# Patient Record
Sex: FEMALE | Race: WHITE | NOT HISPANIC OR LATINO | Employment: FULL TIME | URBAN - METROPOLITAN AREA
[De-identification: names, ages, dates, MRNs, and addresses within clinical notes are randomized per-mention and may not be internally consistent; named-entity substitution may affect disease eponyms.]

---

## 2023-05-10 ENCOUNTER — OFFICE VISIT (OUTPATIENT)
Dept: FAMILY MEDICINE | Facility: CLINIC | Age: 53
End: 2023-05-10
Payer: OTHER GOVERNMENT

## 2023-05-10 VITALS
SYSTOLIC BLOOD PRESSURE: 125 MMHG | OXYGEN SATURATION: 99 % | RESPIRATION RATE: 18 BRPM | DIASTOLIC BLOOD PRESSURE: 87 MMHG | HEIGHT: 65 IN | TEMPERATURE: 96.9 F | WEIGHT: 152 LBS | BODY MASS INDEX: 25.33 KG/M2 | HEART RATE: 76 BPM

## 2023-05-10 DIAGNOSIS — R51.9 BILATERAL HEADACHES: Primary | ICD-10-CM

## 2023-05-10 DIAGNOSIS — M79.675 PAIN OF TOE OF LEFT FOOT: ICD-10-CM

## 2023-05-10 PROCEDURE — 99203 OFFICE O/P NEW LOW 30 MIN: CPT | Performed by: PHYSICIAN ASSISTANT

## 2023-05-10 RX ORDER — TOPIRAMATE SPINKLE 25 MG/1
25 CAPSULE ORAL DAILY
COMMUNITY
Start: 2022-11-21 | End: 2023-05-10

## 2023-05-10 RX ORDER — TOPIRAMATE SPINKLE 25 MG/1
25 CAPSULE ORAL 2 TIMES DAILY
Qty: 60 CAPSULE | Refills: 3 | Status: SHIPPED | OUTPATIENT
Start: 2023-05-10 | End: 2023-06-21

## 2023-05-10 RX ORDER — NAPROXEN 500 MG/1
500 TABLET ORAL 2 TIMES DAILY WITH MEALS
Qty: 20 TABLET | Refills: 0 | Status: SHIPPED | OUTPATIENT
Start: 2023-05-10 | End: 2023-05-21

## 2023-05-10 ASSESSMENT — ENCOUNTER SYMPTOMS: HEADACHES: 1

## 2023-05-10 ASSESSMENT — PAIN SCALES - GENERAL: PAINLEVEL: EXTREME PAIN (8)

## 2023-05-10 NOTE — PROGRESS NOTES
Leticia Gómez is a 52 year old, presenting for the following health issues:  Headache, Foot Pain (left), and Health Maintenance ([facility] in [place])      Headache     History of Present Illness       Reason for visit:  Pain in my foot and headache  Symptom onset:  More than a month  Symptoms include:  Pain  Symptom intensity:  Severe  Symptom progression:  Staying the same  Had these symptoms before:  Yes  Has tried/received treatment for these symptoms:  Yes  Previous treatment was successful:  No  What makes it worse:  Read  What makes it better:  Cold    She eats 0-1 servings of fruits and vegetables daily.She consumes 2 sweetened beverage(s) daily.She exercises with enough effort to increase her heart rate 10 to 19 minutes per day.  She exercises with enough effort to increase her heart rate 3 or less days per week. She is missing 1 dose(s) of medications per week.  She is not taking prescribed medications regularly due to remembering to take.     Last year she was seen in [place] for her L foot pain  She had a cortisone shot and wore a boot and that helped  Having new pain in the 2nd toe x 3 weeks  She has tried ibuprofen without relief  Leg elevation has helped  Denies any history of gout    Pt has been having headaches chronically (several years)  I don't have any records from her visit regarding this from [place]  Presume migraine type HA  Pt denies any hx of aura  HA recently seem worse and are daily and constant  She has been trying Topamax without relief  Sometimes the Exedrin helps and sometimes not  HA is pain in the sinuses and neck  She has hx of vertigo but not recently  She did see neurology when living in [place] many years ago for her HA  She has vision problems and does wear contact or glasses   She is taking prempro and thinks this could be causing weight gain  She started the prempro last year for hot flashes  She feels worse in general since going into  "menopause    Past Medical History:   Diagnosis Date     Hearing loss      Past Surgical History:   Procedure Laterality Date     COSMETIC SURGERY  2002    Blast     Social History     Tobacco Use     Smoking status: Never     Smokeless tobacco: Never   Vaping Use     Vaping status: Never Used   Substance Use Topics     Alcohol use: Not Currently     Comment: occ     Current Outpatient Medications   Medication Sig Dispense Refill     estrogen conj-medroxyPROGESTERone (PREMPRO) 0.3-1.5 MG tablet Take 1 tablet by mouth daily       naproxen (NAPROSYN) 500 MG tablet Take 1 tablet (500 mg) by mouth 2 times daily (with meals) 20 tablet 0     topiramate (TOPAMAX) 25 MG capsule Take 1 capsule (25 mg) by mouth 2 times daily 60 capsule 3     Allergies   Allergen Reactions     No Known Allergies          PHYSICAL EXAM:    /87 (BP Location: Right arm, Patient Position: Sitting, Cuff Size: Adult Regular)   Pulse 76   Temp 96.9  F (36.1  C) (Temporal)   Resp 18   Ht 1.651 m (5' 5\")   Wt 68.9 kg (152 lb)   LMP 12/01/2020   SpO2 99%   BMI 25.29 kg/m      Patient appears non toxic  Neuro: normal speech and gait  Normal facial symmetry  L 2nd toe tender over PIP with some bony prominence    Assessment and Plan:     (R51.9) Bilateral headaches  (primary encounter diagnosis)  Comment: suspect migraines as she's had these for many years. May be worse after the prempro was started for hot flashes.  Recd she decreased dose of prempro to every other day for now and increase dose of topamax from 25mg every day to to bid.  Consider tapering completely off of prempro as HRT may be making HA worse. F/u with PCP which is schedule and referral to neuro, She had imaging for HA years ago but those records not avail in [facility].  Plan: Adult Neurology  Referral, topiramate         (TOPAMAX) 25 MG capsule            (M79.675) Pain of toe of left foot  Comment: suspect her pain due to hammertoe. Recd she see podiatry for this. " Advised wearing shoes that don't put pressure on the joint.  Plan: naproxen (NAPROSYN) 500 MG tablet        Bid with food x10d. Warm water soaks.        Smiley García PA-C

## 2023-05-12 ENCOUNTER — ANCILLARY PROCEDURE (OUTPATIENT)
Dept: GENERAL RADIOLOGY | Facility: CLINIC | Age: 53
End: 2023-05-12
Attending: PODIATRIST
Payer: OTHER GOVERNMENT

## 2023-05-12 ENCOUNTER — OFFICE VISIT (OUTPATIENT)
Dept: PODIATRY | Facility: CLINIC | Age: 53
End: 2023-05-12
Payer: OTHER GOVERNMENT

## 2023-05-12 VITALS
OXYGEN SATURATION: 99 % | HEIGHT: 65 IN | SYSTOLIC BLOOD PRESSURE: 115 MMHG | HEART RATE: 64 BPM | DIASTOLIC BLOOD PRESSURE: 81 MMHG | WEIGHT: 148 LBS | BODY MASS INDEX: 24.66 KG/M2

## 2023-05-12 DIAGNOSIS — M79.675 TOE PAIN, LEFT: Primary | ICD-10-CM

## 2023-05-12 DIAGNOSIS — M20.42 HAMMER TOE OF LEFT FOOT: ICD-10-CM

## 2023-05-12 PROCEDURE — 99203 OFFICE O/P NEW LOW 30 MIN: CPT | Performed by: PODIATRIST

## 2023-05-12 PROCEDURE — 73630 X-RAY EXAM OF FOOT: CPT | Mod: TC | Performed by: RADIOLOGY

## 2023-05-12 NOTE — PROGRESS NOTES
ASSESSMENT:  Encounter Diagnoses   Name Primary?     Toe pain, left Yes     Hammer toe of left foot      MEDICAL DECISION MAKING:  I personally reviewed the x-ray images.  No fractures.  No worrisome bony lesions in the second toe.  The toe is subluxing medially off of the second metatarsal head.  There is contracture.    Pain likely from the deformity or faulty biomechanics involving the left second toe.    Recommendations:  Stiffer soled shoes to offload the toes during the propulsive phase of gait.    Shoes that accommodate the hammertoe deformity.  As needed NSAIDs and ice  Activity modification as needed.    I explained that this is more complicated than just a deformity of the second toe.  This relates to her hallux abductovalgus.  Any surgical intervention might involve surgery for the hallux abductovalgus as well.  This is not currently a source of pain.    Pain does not respond to conservative measures, will consider advanced imaging.    Disclaimer: This note consists of symbols derived from keyboarding, dictation and/or voice recognition software. As a result, there may be errors in the script that have gone undetected. Please consider this when interpreting information found in this chart.    Melvin Cordero DPM, FACFAS, MS    [facility] Department of Podiatry/Foot & Ankle Surgery      ____________________________________________________________________    HPI:         Chief Complaint: Left foot pain  Onset of problem: Weeks  Pain/ discomfort is described as: Aching  Pain Ratin out of 10  Frequency: Constant  The pain is exacerbated by walking wearing shoes  Previous treatment: Naproxen    *  Past Medical History:   Diagnosis Date     Hearing loss    *  *  Past Surgical History:   Procedure Laterality Date     COSMETIC SURGERY      Blast   *  *  Current Outpatient Medications   Medication Sig Dispense Refill     estrogen conj-medroxyPROGESTERone (PREMPRO) 0.3-1.5 MG tablet Take 1 tablet by mouth daily  "      naproxen (NAPROSYN) 500 MG tablet Take 1 tablet (500 mg) by mouth 2 times daily (with meals) 20 tablet 0     topiramate (TOPAMAX) 25 MG capsule Take 1 capsule (25 mg) by mouth 2 times daily 60 capsule 3         EXAM:    Vitals: /81   Pulse 64   Ht 1.651 m (5' 5\")   Wt 67.1 kg (148 lb)   LMP 12/01/2020   SpO2 99%   BMI 24.63 kg/m    BMI: Body mass index is 24.63 kg/m .    Constitutional:  Dalia Fried is in no apparent distress, appears well-nourished.  Cooperative with history and physical exam.    Vascular:  Pedal pulses are palpable for both the DP and PT arteries.  CFT < 3 sec.  No edema.      Neuro: Light touch sensation is intact to the L4, L5, S1 distributions  No evidence of weakness, spasticity, or contracture in the lower extremities.     Derm: Normal texture and turgor.  Localized erythema dorsally over the left second toe proximal interphalangeal joint.      Musculoskeletal:    Lower extremity muscle strength is normal.  Hallux abductovalgus on the left.  The second toe is drifting dorsally and medially.  There is some contracture at the proximal interphalangeal joint.      X-Ray Findings:  I personally reviewed the left foot images.  Please see comments above          "

## 2023-05-12 NOTE — PATIENT INSTRUCTIONS
Thank you for choosing [facility] / [facility] Ankle Surgery!    DR. KUMAR'S CLINIC LOCATIONS:     [facility] TRIAGE LINE: [phone]   [address] APPOINTMENTS: [phone]   [place] [address] RADIOLOGY: [phone]   (Every other Tues - Wed - Fri PM) SET UP SURGERY: [phone]    PHYSICAL THERAPY: [phone]   [facility] BILLING QUESTIONS: [phone] [address] FAX: [phone]   [place] [address]    (Thurs & Fri AM)          HAMMERTOES  Hammertoe is a contracture (bending) of one or both joints of the second, third, fourth, or fifth (little) toes. This abnormal bending can put pressure on the toe when wearing shoes, causing problems to develop.  Hammertoes usually start out as mild deformities and get progressively worse over time. In the earlier stages, hammertoes are flexible and the symptoms can often be managed with noninvasive measures. But if left untreated, hammertoes can become more rigid and will not respond to non-surgical treatment.  Because of the progressive nature of hammertoes, they should receive early attention. Hammertoes never get better without some kind of intervention.  CAUSES  The most common cause of hammertoe is a muscle/tendon imbalance. This imbalance, which leads to a bending of the toe, results from mechanical (structural) changes in the foot that occur over time in some people.  Hammertoes may be aggravated by shoes that don t fit properly. A hammertoe may result if a toe is too long and is forced into a cramped position when a tight shoe is worn.  Occasionally, hammertoe is the result of an earlier trauma to the toe. In some people, hammertoes are inherited.  SYMPTOMS  Pain or irritation of the affected toe when wearing shoes.   Corns and calluses (a buildup of skin) on the toe, between two toes, or on the ball of the foot. Corns are caused by constant friction against the shoe. They may be soft or hard, depending  upon their location.   Inflammation, redness, or a burning sensation   Contracture of the toe   In more severe cases of hammertoe, open sores may form.   DIAGNOSIS  Although hammertoes are readily apparent, to arrive at a diagnosis the foot and ankle surgeon will obtain a thorough history of your symptoms and examine your foot. During the physical examination, the doctor may attempt to reproduce your symptoms by manipulating your foot and will study the contractures of the toes. In addition, the foot and ankle surgeon may take x-rays to determine the degree of the deformities and assess any changes that may have occurred.   Hammertoes are progressive - they don t go away by themselves and usually they will get worse over time. However, not all cases are alike - some hammertoes progress more rapidly than others. Once your foot and ankle surgeon has evaluated your hammertoes, a treatment plan can be developed that is suited to your needs.  NON-SURGICAL TREATMENT  There is a variety of treatment options for hammertoe. The treatment your foot and ankle surgeon selects will depend upon the severity of your hammertoe and other factors.  A number of non-surgical measures can be undertaken:  Padding corns and calluses. Your foot and ankle surgeon can provide or prescribe pads designed to shield corns from irritation. If you want to try over-the-counter pads, avoid the medicated types. Medicated pads are generally not recommended because they may contain a small amount of acid that can be harmful. Consult your surgeon about this option.   Changes in shoewear. Avoid shoes with pointed toes, shoes that are too short, or shoes with high heels - conditions that can force your toe against the front of the shoe. Instead, choose comfortable shoes with a deep, roomy toe box and heels no higher than two inches.   Orthotic devices. A custom orthotic device placed in your shoe may help control the muscle/tendon imbalance.   Injection  therapy. Corticosteroid injections are sometimes used to ease pain and inflammation caused by hammertoe.   Medications. Oral nonsteroidal anti-inflammatory drugs (NSAIDs), such as ibuprofen, may be recommended to reduce pain and inflammation.   Splinting/strapping. Splints or small straps may be applied by the surgeon to realign the bent toe.   Exercises:   1. The Toe Tap  Stand flat on the ground in your bare feet. Raise all of your toes up off the ground as high as you can. Then starting with the little toes, slowly press them down to the ground. After the big toes are on the ground, start over by raising all of them up off the ground again. This motion is similar to tapping your fingers on a desk. Repeat this ten times.     2. Interlocking your Fingers and Toes  Cross your right foot over your knee and place the fingers of your left hand between your toes. Squeeze your toes together, pinching your fingers between them. Spread the toes apart and squeeze them together again. Repeat this ten times then do the other foot. Like most exercises, this will get easier the more you do it. If you are having a lot of difficulty with this exercise, start with just your index finger between your first and second toe, then later add your middle finger between your second and third toes, and so on until you can fit all your fingers between your toes. Do this ten times on each foot. Eventually you will be able to spread your toes apart without using your fingers.    3. Gripping the Floor   the floor by pressing the pads of your toes (not the tips) into the floor without curling your toes. Relax and repeat this ten times. If your shoes have the proper amount of depth, you should be able to do this with shoes on.    HAMMERTOE TOE SURGERY   Hammertoe surgery is complex. The surgical procedure is an attempt to help the toe lay in a better position. Nearly every structure in the toe will be cut including the tendons, ligaments,  skin and bone. Hammertoes are a complex deformity and final toe position is difficult to predict. The only sure way to position a toe is to fuse all three digital joints. That will not happen as some degree of toe motion is needed for walking. The toe may not be completely reduced as the surrounding skin and other structures may not allow the toe to return to a normal position. The tendons on adjacent toes may need to be cut at the time of the original or subsequent surgeries, as interconnections exist between the toes. The toe may drift after surgery. Stiffness may develop leading to new areas of pressure.   Future shoe choices will be critical in allowing the surgery to provide comfort. The toes will still hurt if shoes rub. The original pain may also persist as other foot problems may be contributing to the current pain. The toe may or may not be pinned in place. External pins would require complete avoidance of water on the foot for six weeks. The pin would be removed about six weeks after the surgery. Strict attention to protection is critical. The pin could get bumped or loosen resulting in early removal. Removal might be necessary before the bone heals which would negatively affect the final surgical outcome and toe allignment.

## 2023-05-12 NOTE — LETTER
2023         RE: Dalia Fried  [id number]  [facility] [address]        Dear Colleague,    Thank you for referring your patient, Dalia Fried, to the [facility]. Please see a copy of my visit note below.    ASSESSMENT:  Encounter Diagnoses   Name Primary?     Toe pain, left Yes     Hammer toe of left foot      MEDICAL DECISION MAKING:  I personally reviewed the x-ray images.  No fractures.  No worrisome bony lesions in the second toe.  The toe is subluxing medially off of the second metatarsal head.  There is contracture.    Pain likely from the deformity or faulty biomechanics involving the left second toe.    Recommendations:  Stiffer soled shoes to offload the toes during the propulsive phase of gait.    Shoes that accommodate the hammertoe deformity.  As needed NSAIDs and ice  Activity modification as needed.    I explained that this is more complicated than just a deformity of the second toe.  This relates to her hallux abductovalgus.  Any surgical intervention might involve surgery for the hallux abductovalgus as well.  This is not currently a source of pain.    Pain does not respond to conservative measures, will consider advanced imaging.    Disclaimer: This note consists of symbols derived from keyboarding, dictation and/or voice recognition software. As a result, there may be errors in the script that have gone undetected. Please consider this when interpreting information found in this chart.    Melvin Cordero DPM, FACFAS, [facility] Department of Podiatry/Foot & Ankle Surgery      ____________________________________________________________________    HPI:         Chief Complaint: Left foot pain  Onset of problem: Weeks  Pain/ discomfort is described as: Aching  Pain Ratin out of 10  Frequency: Constant  The pain is exacerbated by walking wearing shoes  Previous treatment: Naproxen    *  Past Medical History:   Diagnosis Date     Hearing loss   "  *  *  Past Surgical History:   Procedure Laterality Date     COSMETIC SURGERY  2002    Blast   *  *  Current Outpatient Medications   Medication Sig Dispense Refill     estrogen conj-medroxyPROGESTERone (PREMPRO) 0.3-1.5 MG tablet Take 1 tablet by mouth daily       naproxen (NAPROSYN) 500 MG tablet Take 1 tablet (500 mg) by mouth 2 times daily (with meals) 20 tablet 0     topiramate (TOPAMAX) 25 MG capsule Take 1 capsule (25 mg) by mouth 2 times daily 60 capsule 3         EXAM:    Vitals: /81   Pulse 64   Ht 1.651 m (5' 5\")   Wt 67.1 kg (148 lb)   LMP 12/01/2020   SpO2 99%   BMI 24.63 kg/m    BMI: Body mass index is 24.63 kg/m .    Constitutional:  Dalia Fried is in no apparent distress, appears well-nourished.  Cooperative with history and physical exam.    Vascular:  Pedal pulses are palpable for both the DP and PT arteries.  CFT < 3 sec.  No edema.      Neuro: Light touch sensation is intact to the L4, L5, S1 distributions  No evidence of weakness, spasticity, or contracture in the lower extremities.     Derm: Normal texture and turgor.  Localized erythema dorsally over the left second toe proximal interphalangeal joint.      Musculoskeletal:    Lower extremity muscle strength is normal.  Hallux abductovalgus on the left.  The second toe is drifting dorsally and medially.  There is some contracture at the proximal interphalangeal joint.      X-Ray Findings:  I personally reviewed the left foot images.  Please see comments above              Again, thank you for allowing me to participate in the care of your patient.        Sincerely,        Melvin Cordero DPM    "

## 2023-05-21 ENCOUNTER — HEALTH MAINTENANCE LETTER (OUTPATIENT)
Age: 53
End: 2023-05-21

## 2023-05-21 ENCOUNTER — MYC REFILL (OUTPATIENT)
Dept: FAMILY MEDICINE | Facility: CLINIC | Age: 53
End: 2023-05-21
Payer: OTHER GOVERNMENT

## 2023-05-21 DIAGNOSIS — M79.675 PAIN OF TOE OF LEFT FOOT: ICD-10-CM

## 2023-05-22 ENCOUNTER — MYC REFILL (OUTPATIENT)
Dept: FAMILY MEDICINE | Facility: CLINIC | Age: 53
End: 2023-05-22
Payer: OTHER GOVERNMENT

## 2023-05-22 DIAGNOSIS — M79.675 PAIN OF TOE OF LEFT FOOT: ICD-10-CM

## 2023-05-23 RX ORDER — NAPROXEN 500 MG/1
500 TABLET ORAL 2 TIMES DAILY WITH MEALS
Qty: 20 TABLET | Refills: 0 | OUTPATIENT
Start: 2023-05-23

## 2023-05-23 RX ORDER — NAPROXEN 500 MG/1
500 TABLET ORAL 2 TIMES DAILY WITH MEALS
Qty: 20 TABLET | Refills: 0 | Status: SHIPPED | OUTPATIENT
Start: 2023-05-23 | End: 2023-05-31

## 2023-05-31 ENCOUNTER — MYC REFILL (OUTPATIENT)
Dept: FAMILY MEDICINE | Facility: CLINIC | Age: 53
End: 2023-05-31
Payer: OTHER GOVERNMENT

## 2023-05-31 DIAGNOSIS — M79.675 PAIN OF TOE OF LEFT FOOT: ICD-10-CM

## 2023-06-01 RX ORDER — NAPROXEN 500 MG/1
500 TABLET ORAL 2 TIMES DAILY WITH MEALS
Qty: 20 TABLET | Refills: 0 | Status: SHIPPED | OUTPATIENT
Start: 2023-06-01 | End: 2023-06-16

## 2023-06-16 ENCOUNTER — MYC REFILL (OUTPATIENT)
Dept: FAMILY MEDICINE | Facility: CLINIC | Age: 53
End: 2023-06-16
Payer: OTHER GOVERNMENT

## 2023-06-16 DIAGNOSIS — M79.675 PAIN OF TOE OF LEFT FOOT: ICD-10-CM

## 2023-06-16 RX ORDER — NAPROXEN 500 MG/1
500 TABLET ORAL 2 TIMES DAILY WITH MEALS
Qty: 20 TABLET | Refills: 0 | Status: SHIPPED | OUTPATIENT
Start: 2023-06-16 | End: 2023-06-21

## 2023-06-16 NOTE — TELEPHONE ENCOUNTER
Appointments in Next Year    Jun 21, 2023  2:30 PM  (Arrive by 2:10 PM)  Provider Visit with Isabel Velazquez NP  [facility] ([facility] ) [phone]   Aug 23, 2023 10:30 AM  (Arrive by 10:15 AM)  New Headache with Elisa Oneill PA-C  [facility] ([facility] ) [phone]        Pt has not yet been seen to establish care with provider     Krishna Mckeon RN  [facility]

## 2023-06-21 ENCOUNTER — OFFICE VISIT (OUTPATIENT)
Dept: FAMILY MEDICINE | Facility: CLINIC | Age: 53
End: 2023-06-21
Payer: OTHER GOVERNMENT

## 2023-06-21 VITALS
TEMPERATURE: 98.1 F | OXYGEN SATURATION: 99 % | HEIGHT: 64 IN | HEART RATE: 82 BPM | BODY MASS INDEX: 24.89 KG/M2 | WEIGHT: 145.8 LBS | RESPIRATION RATE: 18 BRPM | SYSTOLIC BLOOD PRESSURE: 109 MMHG | DIASTOLIC BLOOD PRESSURE: 81 MMHG

## 2023-06-21 DIAGNOSIS — M20.42 HAMMER TOE OF LEFT FOOT: Primary | ICD-10-CM

## 2023-06-21 DIAGNOSIS — Z12.31 VISIT FOR SCREENING MAMMOGRAM: ICD-10-CM

## 2023-06-21 DIAGNOSIS — Z11.59 NEED FOR HEPATITIS C SCREENING TEST: ICD-10-CM

## 2023-06-21 DIAGNOSIS — Z12.4 CERVICAL CANCER SCREENING: ICD-10-CM

## 2023-06-21 DIAGNOSIS — M79.675 PAIN OF TOE OF LEFT FOOT: ICD-10-CM

## 2023-06-21 DIAGNOSIS — Z12.11 SCREEN FOR COLON CANCER: ICD-10-CM

## 2023-06-21 DIAGNOSIS — Z11.4 SCREENING FOR HIV (HUMAN IMMUNODEFICIENCY VIRUS): ICD-10-CM

## 2023-06-21 PROBLEM — H81.10 BENIGN PAROXYSMAL POSITIONAL VERTIGO: Status: ACTIVE | Noted: 2023-06-21

## 2023-06-21 PROBLEM — C76.0: Status: ACTIVE | Noted: 2023-06-21

## 2023-06-21 PROBLEM — A88.1 EPIDEMIC VERTIGO: Status: ACTIVE | Noted: 2023-06-21

## 2023-06-21 PROCEDURE — 99214 OFFICE O/P EST MOD 30 MIN: CPT | Performed by: NURSE PRACTITIONER

## 2023-06-21 RX ORDER — NAPROXEN 500 MG/1
500 TABLET ORAL 2 TIMES DAILY WITH MEALS
Qty: 60 TABLET | Refills: 3 | Status: SHIPPED | OUTPATIENT
Start: 2023-06-21 | End: 2023-10-23

## 2023-06-21 ASSESSMENT — PAIN SCALES - GENERAL: PAINLEVEL: SEVERE PAIN (7)

## 2023-06-21 NOTE — PROGRESS NOTES
"  Assessment & Plan     Visit for screening mammogram  Will return for physical and did have mammogram will get abstracted from [facility]    Screen for colon cancer  - will consider at physical     Screening for HIV (human immunodeficiency virus)  - will consider at physical    Need for hepatitis C screening test  Will consider at physical    Cervical cancer screening  Will return for physical     Hammer toe of left foot  Will refer to [facility] for repair  - Orthopedic  Referral    Pain of toe of left foot  - medication refilled for pain  - naproxen (NAPROSYN) 500 MG tablet  Dispense: 60 tablet; Refill: 3    Migraine  - neurology appointment up coming.   - stop topamax as it is not working       BMI:   Estimated body mass index is 25.42 kg/m  as calculated from the following:    Height as of this encounter: 1.613 m (5' 3.5\").    Weight as of this encounter: 66.1 kg (145 lb 12.8 oz).   Weight management plan: Discussed healthy diet and exercise guidelines        Isabel Velazquez NP  [facility]    Leticia Gómez is a 52 year old, presenting for the following health issues:  Recheck Medication and Establish Care        6/21/2023     2:06 PM   Additional Questions   Roomed by Michell REYES     History of Present Illness       Headaches:   Since the patient's last clinic visit, headaches are: no change  The patient is getting headaches:  Daily  She is able to do normal daily activities when she has a migraine.  The patient is taking the following rescue/relief medications:  Other   Patient states \"I get only a small amount of relief\" from the rescue/relief medications.   The patient is taking the following medications to prevent migraines:  Topomax  In the past 4 weeks, the patient has gone to an Urgent Care or Emergency Room 0 times times due to headaches.    Reason for visit:  Headache, menopause,weight gain,feet,spider vein,arthritis..    She eats 2-3 servings of fruits and " "vegetables daily.She consumes 2 sweetened beverage(s) daily.She exercises with enough effort to increase her heart rate 20 to 29 minutes per day.  She exercises with enough effort to increase her heart rate 5 days per week.   She is taking medications regularly.    She is here to establish care. She does have headaches and chronic foot pain. She was told she should get a CT scan and she does have a neurology appt upcoming in August. She is still having daily headaches, sometimes will have nausea and photophobia. She is interested in botox and she has discussed with other and they have had benefit from botox. She has been on topamax and since has stopped taking it.     She has stopped the hormone replacement and she has lost the weight and she is feeling good without the medication.     She is having some changes in her hands and thinks that it is arthritis    She has right foot hammer toe, she was seen by a provider and essentially nothing came of the visit and she has chronic left foot pain. She has been taking naproxen and without it she has too much pain to do her daily activities.     Review of Systems   Constitutional, HEENT, cardiovascular, pulmonary, gi and gu systems are negative, except as otherwise noted.      Objective    /81 (BP Location: Right arm, Patient Position: Sitting, Cuff Size: Adult Regular)   Pulse 82   Temp 98.1  F (36.7  C) (Oral)   Resp 18   Ht 1.613 m (5' 3.5\")   Wt 66.1 kg (145 lb 12.8 oz)   LMP 12/01/2020   SpO2 99%   BMI 25.42 kg/m    Body mass index is 25.42 kg/m .  Physical Exam   GENERAL: healthy, alert and no distress  NECK: no adenopathy, no asymmetry, masses, or scars and thyroid normal to palpation  RESP: lungs clear to auscultation - no rales, rhonchi or wheezes  CV: regular rate and rhythm, normal S1 S2, no S3 or S4, no murmur, click or rub, no peripheral edema and peripheral pulses strong  ABDOMEN: soft, nontender, no hepatosplenomegaly, no masses and bowel " sounds normal  MS: no gross musculoskeletal defects noted, no edema

## 2023-07-20 ENCOUNTER — MYC MEDICAL ADVICE (OUTPATIENT)
Dept: FAMILY MEDICINE | Facility: CLINIC | Age: 53
End: 2023-07-20
Payer: OTHER GOVERNMENT

## 2023-08-07 ENCOUNTER — OFFICE VISIT (OUTPATIENT)
Dept: FAMILY MEDICINE | Facility: CLINIC | Age: 53
End: 2023-08-07
Payer: OTHER GOVERNMENT

## 2023-08-07 VITALS
HEART RATE: 79 BPM | TEMPERATURE: 98 F | BODY MASS INDEX: 25.9 KG/M2 | HEIGHT: 64 IN | RESPIRATION RATE: 16 BRPM | SYSTOLIC BLOOD PRESSURE: 128 MMHG | OXYGEN SATURATION: 100 % | DIASTOLIC BLOOD PRESSURE: 80 MMHG | WEIGHT: 151.7 LBS

## 2023-08-07 DIAGNOSIS — Z11.4 SCREENING FOR HIV (HUMAN IMMUNODEFICIENCY VIRUS): ICD-10-CM

## 2023-08-07 DIAGNOSIS — Z00.00 ROUTINE GENERAL MEDICAL EXAMINATION AT A HEALTH CARE FACILITY: ICD-10-CM

## 2023-08-07 DIAGNOSIS — Z11.59 NEED FOR HEPATITIS C SCREENING TEST: ICD-10-CM

## 2023-08-07 DIAGNOSIS — Z12.11 SCREEN FOR COLON CANCER: ICD-10-CM

## 2023-08-07 DIAGNOSIS — Z12.31 VISIT FOR SCREENING MAMMOGRAM: Primary | ICD-10-CM

## 2023-08-07 DIAGNOSIS — Z12.4 CERVICAL CANCER SCREENING: ICD-10-CM

## 2023-08-07 DIAGNOSIS — E66.3 OVERWEIGHT (BMI 25.0-29.9): ICD-10-CM

## 2023-08-07 DIAGNOSIS — N89.8 VAGINAL DRYNESS: ICD-10-CM

## 2023-08-07 PROCEDURE — 90472 IMMUNIZATION ADMIN EACH ADD: CPT | Performed by: NURSE PRACTITIONER

## 2023-08-07 PROCEDURE — 87624 HPV HI-RISK TYP POOLED RSLT: CPT | Performed by: NURSE PRACTITIONER

## 2023-08-07 PROCEDURE — 90750 HZV VACC RECOMBINANT IM: CPT | Performed by: NURSE PRACTITIONER

## 2023-08-07 PROCEDURE — 90471 IMMUNIZATION ADMIN: CPT | Performed by: NURSE PRACTITIONER

## 2023-08-07 PROCEDURE — G0145 SCR C/V CYTO,THINLAYER,RESCR: HCPCS | Performed by: NURSE PRACTITIONER

## 2023-08-07 PROCEDURE — 99396 PREV VISIT EST AGE 40-64: CPT | Mod: 25 | Performed by: NURSE PRACTITIONER

## 2023-08-07 PROCEDURE — 90715 TDAP VACCINE 7 YRS/> IM: CPT | Performed by: NURSE PRACTITIONER

## 2023-08-07 PROCEDURE — 99213 OFFICE O/P EST LOW 20 MIN: CPT | Mod: 25 | Performed by: NURSE PRACTITIONER

## 2023-08-07 RX ORDER — ESTRADIOL 0.1 MG/G
2 CREAM VAGINAL
Qty: 42.5 G | Refills: 4 | Status: SHIPPED | OUTPATIENT
Start: 2023-08-07 | End: 2023-11-16

## 2023-08-07 RX ORDER — PHENTERMINE HYDROCHLORIDE 37.5 MG/1
37.5 CAPSULE ORAL EVERY MORNING
Qty: 30 CAPSULE | Refills: 5 | Status: SHIPPED | OUTPATIENT
Start: 2023-08-07 | End: 2024-02-06

## 2023-08-07 ASSESSMENT — PAIN SCALES - GENERAL: PAINLEVEL: NO PAIN (0)

## 2023-08-07 NOTE — PROGRESS NOTES
Assessment & Plan     Visit for screening mammogram  - will order future mammogram  - MA SCREENING DIGITAL BILAT - Future  (s+30)    Screen for colon cancer  Ordered for screening  - Colonoscopy Screening  Referral    Screening for HIV (human immunodeficiency virus)  declined    Need for hepatitis C screening test  declined    Cervical cancer screening  Completed  - Pap Screen with HPV - recommended age 30 - 65 years    Vaginal dryness  - will do trial of estradiol cream, follow up if to expensive or not working  - estradiol (ESTRACE) 0.1 MG/GM vaginal cream  Dispense: 42.5 g; Refill: 4    Overweight (BMI 25.0-29.9)  -trial of phentermine  - phentermine (ADIPEX-P) 37.5 MG capsule  Dispense: 30 capsule; Refill: 5    Routine general medical examination at a health care facility  Up to date on other health maintenance.       Isabel Velazquez NP  [facility] ESTEFANY Gómez is a 52 year old, presenting for the following health issues:  Vaginal Problem      8/7/2023     9:17 AM   Additional Questions   Roomed by Michell REYES       History of Present Illness       Reason for visit:  Menopause    She eats 2-3 servings of fruits and vegetables daily.She consumes 2 sweetened beverage(s) daily.She exercises with enough effort to increase her heart rate 10 to 19 minutes per day.  She exercises with enough effort to increase her heart rate 3 or less days per week.   She is taking medications regularly.     She is here for vaginal dryness, she is post menopausal, she would like to discuss treatment, she is due for pap smear,  and colonoscopy. Up to date on mammogram, done in 10/22, will order for future. She is concerned about gaining weight and she would like to discuss options for weight loss.       Review of Systems   Genitourinary:  Positive for vaginal discharge.            Objective    /80 (BP Location: Right arm, Patient Position: Sitting, Cuff Size: Adult Regular)    "Pulse 79   Temp 98  F (36.7  C) (Oral)   Resp 16   Ht 1.613 m (5' 3.5\")   Wt 68.8 kg (151 lb 11.2 oz)   LMP 12/01/2020   SpO2 100%   BMI 26.45 kg/m    Body mass index is 26.45 kg/m .  Physical Exam   GENERAL: healthy, alert and no distress  EYES: Eyes grossly normal to inspection, PERRL and conjunctivae and sclerae normal  HENT: ear canals and TM's normal, nose and mouth without ulcers or lesions  NECK: no adenopathy, no asymmetry, masses, or scars and thyroid normal to palpation  RESP: lungs clear to auscultation - no rales, rhonchi or wheezes  CV: regular rate and rhythm, normal S1 S2, no S3 or S4, no murmur, click or rub, no peripheral edema and peripheral pulses strong  ABDOMEN: soft, nontender, no hepatosplenomegaly, no masses and bowel sounds normal   (female): normal female external genitalia, normal urethral meatus , vaginal mucosal atrophy, and normal cervix, adnexae, and uterus without masses.  MS: no gross musculoskeletal defects noted, no edema  SKIN: no suspicious lesions or rashes  NEURO: Normal strength and tone, mentation intact and speech normal  PSYCH: mentation appears normal, affect normal/bright  LYMPH: no cervical, supraclavicular, axillary, or inguinal adenopathy    Please abstract the following data from this visit with this patient into the appropriate field in Epic:    Tests that can be patient reported without a hard copy:    Mammogram done on this date: 10/24/2022 (approximately), by this group: [facility], results were Negative .       Note to Abstraction: If this section is blank, no results were found via Chart Review/Care Everywhere.       "

## 2023-08-09 LAB
BKR LAB AP GYN ADEQUACY: NORMAL
BKR LAB AP GYN INTERPRETATION: NORMAL
BKR LAB AP HPV REFLEX: NORMAL
BKR LAB AP LMP: NORMAL
BKR LAB AP PREVIOUS ABNORMAL: NORMAL
PATH REPORT.COMMENTS IMP SPEC: NORMAL
PATH REPORT.COMMENTS IMP SPEC: NORMAL
PATH REPORT.RELEVANT HX SPEC: NORMAL

## 2023-08-11 ENCOUNTER — PATIENT OUTREACH (OUTPATIENT)
Dept: FAMILY MEDICINE | Facility: CLINIC | Age: 53
End: 2023-08-11
Payer: OTHER GOVERNMENT

## 2023-08-11 LAB
HUMAN PAPILLOMA VIRUS 16 DNA: NEGATIVE
HUMAN PAPILLOMA VIRUS 18 DNA: NEGATIVE
HUMAN PAPILLOMA VIRUS FINAL DIAGNOSIS: ABNORMAL
HUMAN PAPILLOMA VIRUS OTHER HR: POSITIVE

## 2023-08-14 ENCOUNTER — TELEPHONE (OUTPATIENT)
Dept: GASTROENTEROLOGY | Facility: CLINIC | Age: 53
End: 2023-08-14
Payer: OTHER GOVERNMENT

## 2023-08-14 NOTE — TELEPHONE ENCOUNTER
"Endoscopy Scheduling Screen    Have you had a positive Covid test in the last 14 days?  No    Are you active on MyChart?   Yes    What insurance is in the chart?  Other:  Lavin[employer]/Fito    Ordering/Referring Provider:   KARIS HERNANDEZ        (If ordering provider performs procedure, schedule with ordering provider unless otherwise instructed. )    BMI: Estimated body mass index is 26.45 kg/m  as calculated from the following:    Height as of 8/7/23: 1.613 m (5' 3.5\").    Weight as of 8/7/23: 68.8 kg (151 lb 11.2 oz).     Sedation Ordered  moderate sedation.   If patient BMI > 50 do not schedule in ASC.    Are you taking any prescription medications for pain?   No    Are you taking methadone or Suboxone?  No    Do you have a history of malignant hyperthermia or adverse reaction to anesthesia?  No    (Females) Are you currently pregnant?   No     Have you been diagnosed or told you have pulmonary hypertension?   No    Do you have an LVAD?  No    Have you been told you have moderate to severe sleep apnea?  No    Have you been told you have COPD, asthma, or any other lung disease?  No    Do you have any heart conditions?  No     Have you ever had or are you awaiting a heart or lung transplant?   No    Have you had a stroke or transient ischemic attack (TIA aka \"mini stroke\" in the last 6 months?   No    Have you been diagnosed with or been told you have cirrhosis of the liver?   No    Are you currently on dialysis?   No    Do you need assistance transferring?   No    BMI: Estimated body mass index is 26.45 kg/m  as calculated from the following:    Height as of 8/7/23: 1.613 m (5' 3.5\").    Weight as of 8/7/23: 68.8 kg (151 lb 11.2 oz).     Is patients BMI > 40 and scheduling location [facility]?  No    Do you take the medication Phentermine, Ozempic or Wegovy?  No    Do you take the medication Naltrexone?  No    Do you take blood thinners?  No      Prep   Are you currently on dialysis or do you have chronic kidney " disease?  No    Do you have a diagnosis of diabetes?  No    Do you have a diagnosis of cystic fibrosis (CF)?  No    On a regular basis do you go 3 -5 days between bowel movements?  No    BMI > 40?  No    Preferred Pharmacy:    [facility]/pharmacy #[id number] - [place], [place] - [address] [address]  [address] [address] [address]  Phone: [phone] Fax: [phone]      Final Scheduling Details   Colonoscopy prep sent?  MiraLAX (No Mag Citrate)    Procedure scheduled  Colonoscopy    Surgeon:  Kavon     Date of procedure:  11/17     Schedule PAC:   No    Location  [facility]    Sedation   Moderate Sedation    Patient Reminders:   You will receive a call from a Nurse to review instructions and health history.  This assessment must be completed prior to your procedure.  Failure to complete the Nurse assessment may result in the procedure being cancelled.      On the day of your procedure, please designate an adult(s) who can drive you home stay with you for the next 24 hours. The medicines used in the exam will make you sleepy. You will not be able to drive.      You cannot take public transportation, ride share services, or non-medical taxi service without a responsible caregiver.  Medical transport services are allowed with the requirement that a responsible caregiver will receive you at your destination.  We require that drivers and caregivers are confirmed prior to your procedure.

## 2023-08-20 ASSESSMENT — HEADACHE IMPACT TEST (HIT 6)
WHEN YOU HAVE HEADACHES HOW OFTEN IS THE PAIN SEVERE: ALWAYS
HOW OFTEN DID HEADACHS LIMIT CONCENTRATION ON WORK OR DAILY ACTIVITY: VERY OFTEN
HOW OFTEN HAVE YOU FELT FED UP OR IRRITATED BECAUSE OF YOUR HEADACHES: ALWAYS
HIT6 TOTAL SCORE: 68
HOW OFTEN HAVE YOU FELT TOO TIRED TO WORK BECAUSE OF YOUR HEADACHES: SOMETIMES
HOW OFTEN DO HEADACHES LIMIT YOUR DAILY ACTIVITIES: SOMETIMES
WHEN YOU HAVE A HEADACHE HOW OFTEN DO YOU WISH YOU COULD LIE DOWN: VERY OFTEN

## 2023-08-23 ENCOUNTER — OFFICE VISIT (OUTPATIENT)
Dept: NEUROLOGY | Facility: CLINIC | Age: 53
End: 2023-08-23
Attending: PHYSICIAN ASSISTANT
Payer: OTHER GOVERNMENT

## 2023-08-23 VITALS — OXYGEN SATURATION: 100 % | SYSTOLIC BLOOD PRESSURE: 129 MMHG | HEART RATE: 88 BPM | DIASTOLIC BLOOD PRESSURE: 88 MMHG

## 2023-08-23 DIAGNOSIS — M79.10 MUSCLE TENSION PAIN: ICD-10-CM

## 2023-08-23 DIAGNOSIS — G44.40 MEDICATION OVERUSE HEADACHE: ICD-10-CM

## 2023-08-23 DIAGNOSIS — G43.709 CHRONIC MIGRAINE W/O AURA W/O STATUS MIGRAINOSUS, NOT INTRACTABLE: Primary | ICD-10-CM

## 2023-08-23 PROCEDURE — 99204 OFFICE O/P NEW MOD 45 MIN: CPT

## 2023-08-23 RX ORDER — TOPIRAMATE 25 MG/1
TABLET, FILM COATED ORAL
Qty: 120 TABLET | Refills: 1 | Status: SHIPPED | OUTPATIENT
Start: 2023-08-23 | End: 2023-10-23

## 2023-08-23 RX ORDER — CYCLOBENZAPRINE HCL 5 MG
5 TABLET ORAL
Qty: 20 TABLET | Refills: 3 | Status: SHIPPED | OUTPATIENT
Start: 2023-08-23 | End: 2023-10-23

## 2023-08-23 RX ORDER — SUMATRIPTAN 50 MG/1
50 TABLET, FILM COATED ORAL
Qty: 18 TABLET | Refills: 3 | Status: SHIPPED | OUTPATIENT
Start: 2023-08-23 | End: 2023-08-29 | Stop reason: SINTOL

## 2023-08-23 ASSESSMENT — PAIN SCALES - GENERAL: PAINLEVEL: NO PAIN (0)

## 2023-08-23 NOTE — LETTER
8/23/2023         RE: Dalia Fried  [id number]  [facility] [address]        Dear Colleague,    Thank you for referring your patient, Dalia Fried, to the [facility]. Please see a copy of my visit note below.    [facility]   Headache Neurology Consult    August 23, 2023     Dalia Fried MRN# [id number]   YOB: 1970 Age: 52 year old     Referring provider: Smiley García          Assessment and Recommendations:     Dalia Fried is a 52 year old female who presents for further evaluation of headache.    Her headache presentation meets criteria for likely chronic migraine without aura, currently complicated by medication overuse headache.  I suspect she has migraine on a genetic basis.    Her neurologic examination is over all intact today.  I did not recommend further evaluation for secondary cause of headache today.  However, should her headache features change or headaches be refractory to treatment as anticipated, could consider obtaining updated head imaging.     We discussed the following treatment strategy:  - For acute treatment of mild headache, she may use Excedrin as needed, not to exceed 9 days per month to avoid medication overuse.  We discussed that she should try to limit her use of naproxen for toe pain as well, to no more than 14 days/month to avoid medication overuse.  - For acute treatment of moderate to severe headache, she may try sumatriptan 50 mg tablet taken at onset of headache with a repeat dose taken after 2 hours if needed. Use should not exceed 9 days per month to avoid medication overuse.  Side effects reviewed.  - She declines any antiemetics today.  - Additionally, she may use cyclobenzaprine 5 mg tablet as needed for muscle spasms.  Offered physical therapy for neck pain but she declines this today.    Her current frequency and severity of headaches warrant prevention.  -I recommend  a retrial of topiramate, we will work to optimize the dose.  Recommend she start with 25 mg nightly for 1 week, and then increase by 25 mg each week, as needed and as tolerated, up to a target dose of 100 mg nightly.  She may stay at best tolerated dose.  I recommend a treatment trial of 6 to 8 weeks at maximally tolerated dose prior to determining effectiveness.  Side effects reviewed.  - If topiramate is not effective or not tolerated after adequate treatment trial, could consider propranolol or nortriptyline.  Beyond these options, she may be a candidate for CGRP inhibitors or botulinum toxin injections following a chronic migraine protocol.    I will plan to follow-up with her in 3 months to monitor her progress.    Elisa Oneill PA-C  Headache Neurology  [facility]            Chief Complaint:     Chief Complaint   Patient presents with     Headache     Scheduled per patient/ referred by Dr. García, Smiley Paul PA-C/Bilateral headaches [R51.9],, referral in epic           History is obtained from the patient and medical record.      Dalia Fried is a 52 year old female who presents for further evaluation of headaches.     She has had headaches for about 30 years. She reports initially headaches were fairly managed with some daily caffeine (green tea) intake. Headaches became worse when she moved to the  about 18 years ago and have become daily.     She describes her headaches as starting at the back of her neck moving up towards the back of her head, wrapping to bilateral temples and around her eyes.  It is a pressure-like pain.  Pain can be worse overnight and she often wakes up with a headache.  Headache is present constantly unless she is taking medications to treat headache.  She rates her typical headache as a 5-6/10 and severe headaches as an 8-9/10 in intensity.  She currently reports 30/30 headache days per month, with over half of these being severe headache days.    She  has associated nausea with rare vomiting, fatigue, heat intolerance, and phonophobia.  She notes when a more severe headache is coming on as her neck pain will become worse and her neck may feel warm.    She can have blurred vision with headache, but otherwise denies visual disturbances.  She denies focal paresthesias or weakness, unilateral autonomic features, positional component.    For acute management of headache, she has been using Excedrin Migraine and finds this to be typically helpful.  She has been using 3 pills twice daily.   She recently was prescribed naproxen twice daily for management of some toe pain.  She is planning on surgery for her toe.     In the past, she has tried topiramate for headache prevention.  She has only used 25 mg, up to twice daily dosing.  She did not notice any headache improvement at this dose, but also did not have any side effects.    She finds that eyestrain, needing to concentrate for prolonged.'s, or stress may contribute to her headaches.    She is up-to-date on her eye exams.    She has a history of vertigo (in the past lasted for 7 years, since resolved).  She otherwise denies dizziness or lightheadedness associated with her headaches.  She has chronic right-sided hearing loss.    She denies any issues with her sleep currently.  She drinks 1 small coffee daily, sometimes an occasional soda.    She denies any history of significant head or neck injuries.    Her mother had a history of migraine headaches.               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          Past Surgical History:     Past Surgical History:   Procedure Laterality Date     COSMETIC SURGERY  2002    Blast             Social History:     She is  with 4 children. She is not currently working.     Social History      Socioeconomic History     Marital status:      Spouse name: Not on file     Number of children: Not on file     Years of education: Not on file     Highest education level: Not on file   Occupational History     Not on file   Tobacco Use     Smoking status: Never     Smokeless tobacco: Never   Vaping Use     Vaping Use: Never used   Substance and Sexual Activity     Alcohol use: Not Currently     Comment: occ     Drug use: Never     Sexual activity: Yes     Partners: Male     Birth control/protection: Post-menopausal, Female Surgical   Other Topics Concern     Parent/sibling w/ CABG, MI or angioplasty before 65F 55M? No   Social History Narrative     Not on file     Social Determinants of Health     Financial Resource Strain: Not on file   Food Insecurity: Not on file   Transportation Needs: Not on file   Physical Activity: Not on file   Stress: Not on file   Social Connections: Not on file   Intimate Partner Violence: Not on file   Housing Stability: Not on file             Family History:     Family History   Problem Relation Age of Onset     Diabetes Father      Thyroid Disease Father              Allergies:      Allergies   Allergen Reactions     No Known Allergies              Medications:     Current Outpatient Medications:      estradiol (ESTRACE) 0.1 MG/GM vaginal cream, Place 2 g vaginally three times a week At bedtime., Disp: 42.5 g, Rfl: 4     naproxen (NAPROSYN) 500 MG tablet, Take 1 tablet (500 mg) by mouth 2 times daily (with meals), Disp: 60 tablet, Rfl: 3     phentermine (ADIPEX-P) 37.5 MG capsule, Take 1 capsule (37.5 mg) by mouth every morning, Disp: 30 capsule, Rfl: 5          Physical Exam:   /88   Pulse 88   LMP 12/01/2020   SpO2 100%      General: In no acute distress.  Head: Normocephalic, atraumatic. No radiating pain with palpation over the supraorbital notches, occipital nerves. Temporal pulses intact.   Neck: Normal range of motion with lateral head movements and neck  flexion.  Eyes: No conjunctival injection, no scleral icterus.     Neurologic Exam:  Mental Status Exam: Alert, awake and oriented to situation. No dysarthria. Speech of normal fluency.  Cranial Nerves: Fundoscopic exam with clear disc margins bilaterally. PERRLA, EOMs intact, no nystagmus, facial movements symmetric, facial sensation intact to light touch, hearing intact to conversation, trapezius and SCMs 5/5 bilaterally, tongue midline and fully mobile. No tongue atrophy or fasciculations.   Motor: Normal tone in all four extremities, no atrophy or fasciculations. 5/5 strength bilaterally in shoulder abduction, elbow flexion and extension, wrist flexion and extension, hip flexion, knee flexion and extension, dorsiflexion and plantarflexion. No tremors or abnormal movements noted.  Sensory: Sensation intact to light touch on arms and legs bilaterally.   Coordination: Finger-nose-finger intact bilaterally. Rapidly alternating movements intact bilaterally in the upper extremities. Normal finger tapping bilaterally. Normal Romberg.  Reflexes: 2+ and symmetric in triceps, biceps, brachioradialis, patellar, and Achilles. Plantar reflexes are downgoing bilaterally.  Gait: Normal gait. Able to toe and heel walk. Normal tandem gait.            Data:     CT head from several years ago (when living in [facility]) - reportedly normal per patient          Again, thank you for allowing me to participate in the care of your patient.        Sincerely,        PEPPER REEVES PA-C

## 2023-08-23 NOTE — PATIENT INSTRUCTIONS
When you have a headache:  - Mild to moderate headache: Excedrin - you can use this up to 9 days per month  - Moderate to severe headache: Sumatriptan 50 mg at onset of headache. Can repeat dose after 2 hours if needed. Max daily dose 200 mg/24 hours. You can use this up to 9 days per month.   - For muscle spasms: Cyclobenzaprine take 1 tablet at bedtime as needed for muscle tension pain.    When using naproxen for toe pain, try to limit to no more than 14 days per month.    For headache prevention:  Topiramate:  Start with 25 mg (1 tablet) nightly for 1 week.   Increase by 25 mg each week, as needed and as tolerated, up to 100 mg nightly.   Stay at the best tolerated dose.   Side effects: numbness or tingling at fingers, toes, face; fatigue; decreased appetite or weight loss; difficulty with word-finding; increased risk of kidney stones

## 2023-08-23 NOTE — NURSING NOTE
"Dalia Fried is a 52 year old female who presents for:  Chief Complaint   Patient presents with    Headache     Scheduled per patient/ referred by Dr. García, Smiley Paul PA-C/Bilateral headaches [R51.9],, referral in epic        Initial Vitals:  /88   Pulse 88   LMP 12/01/2020   SpO2 100%  Estimated body mass index is 26.45 kg/m  as calculated from the following:    Height as of 8/7/23: 1.613 m (5' 3.5\").    Weight as of 8/7/23: 68.8 kg (151 lb 11.2 oz).. There is no height or weight on file to calculate BSA. BP completed using cuff size: regular  No Pain (0)    Nursing Comments:     Loyda De León MA    "

## 2023-08-25 ENCOUNTER — MYC MEDICAL ADVICE (OUTPATIENT)
Dept: NEUROLOGY | Facility: CLINIC | Age: 53
End: 2023-08-25
Payer: OTHER GOVERNMENT

## 2023-08-25 DIAGNOSIS — G43.709 CHRONIC MIGRAINE W/O AURA W/O STATUS MIGRAINOSUS, NOT INTRACTABLE: Primary | ICD-10-CM

## 2023-08-25 NOTE — TELEPHONE ENCOUNTER
Please see [employer] message regarding reaction to sumatriptan.     Poonam YOUNG RN, BSN  [facility]

## 2023-08-29 RX ORDER — RIZATRIPTAN BENZOATE 10 MG/1
10 TABLET ORAL
Qty: 18 TABLET | Refills: 3 | Status: SHIPPED | OUTPATIENT
Start: 2023-08-29 | End: 2023-09-19

## 2023-09-15 DIAGNOSIS — G43.709 CHRONIC MIGRAINE W/O AURA W/O STATUS MIGRAINOSUS, NOT INTRACTABLE: ICD-10-CM

## 2023-09-15 RX ORDER — TOPIRAMATE 25 MG/1
TABLET, FILM COATED ORAL
Qty: 120 TABLET | Refills: 1 | OUTPATIENT
Start: 2023-09-15

## 2023-09-18 ENCOUNTER — MYC MEDICAL ADVICE (OUTPATIENT)
Dept: NEUROLOGY | Facility: CLINIC | Age: 53
End: 2023-09-18
Payer: OTHER GOVERNMENT

## 2023-09-18 DIAGNOSIS — G43.709 CHRONIC MIGRAINE W/O AURA W/O STATUS MIGRAINOSUS, NOT INTRACTABLE: Primary | ICD-10-CM

## 2023-09-19 RX ORDER — NARATRIPTAN 2.5 MG/1
2.5 TABLET ORAL
Qty: 18 TABLET | Refills: 3 | Status: SHIPPED | OUTPATIENT
Start: 2023-09-19 | End: 2023-10-23

## 2023-10-03 ENCOUNTER — HOSPITAL ENCOUNTER (EMERGENCY)
Facility: CLINIC | Age: 53
Discharge: HOME | End: 2023-10-03
Attending: EMERGENCY MEDICINE | Admitting: EMERGENCY MEDICINE
Payer: OTHER GOVERNMENT

## 2023-10-03 VITALS
OXYGEN SATURATION: 100 % | HEIGHT: 63 IN | RESPIRATION RATE: 18 BRPM | DIASTOLIC BLOOD PRESSURE: 94 MMHG | SYSTOLIC BLOOD PRESSURE: 134 MMHG | HEART RATE: 75 BPM | WEIGHT: 150 LBS | TEMPERATURE: 97.4 F | BODY MASS INDEX: 26.58 KG/M2

## 2023-10-03 DIAGNOSIS — G43.909 MIGRAINE: ICD-10-CM

## 2023-10-03 DIAGNOSIS — R11.0 NAUSEA: ICD-10-CM

## 2023-10-03 DIAGNOSIS — U07.1 COVID-19: ICD-10-CM

## 2023-10-03 LAB
ALBUMIN SERPL BCG-MCNC: 4.3 G/DL (ref 3.5–5.2)
ALP SERPL-CCNC: 90 U/L (ref 35–104)
ALT SERPL W P-5'-P-CCNC: 21 U/L (ref 0–50)
ANION GAP SERPL CALCULATED.3IONS-SCNC: 15 MMOL/L (ref 7–15)
AST SERPL W P-5'-P-CCNC: 23 U/L (ref 0–45)
BASO+EOS+MONOS # BLD AUTO: NORMAL 10*3/UL
BASO+EOS+MONOS NFR BLD AUTO: NORMAL %
BASOPHILS # BLD AUTO: 0 10E3/UL (ref 0–0.2)
BASOPHILS NFR BLD AUTO: 0 %
BILIRUB SERPL-MCNC: 0.2 MG/DL
BUN SERPL-MCNC: 12.9 MG/DL (ref 6–20)
CALCIUM SERPL-MCNC: 9.1 MG/DL (ref 8.6–10)
CHLORIDE SERPL-SCNC: 103 MMOL/L (ref 98–107)
CREAT SERPL-MCNC: 0.81 MG/DL (ref 0.51–0.95)
DEPRECATED HCO3 PLAS-SCNC: 21 MMOL/L (ref 22–29)
EGFRCR SERPLBLD CKD-EPI 2021: 87 ML/MIN/1.73M2
EOSINOPHIL # BLD AUTO: 0.2 10E3/UL (ref 0–0.7)
EOSINOPHIL NFR BLD AUTO: 5 %
ERYTHROCYTE [DISTWIDTH] IN BLOOD BY AUTOMATED COUNT: 13.1 % (ref 10–15)
FLUAV RNA SPEC QL NAA+PROBE: NEGATIVE
FLUBV RNA RESP QL NAA+PROBE: NEGATIVE
GLUCOSE SERPL-MCNC: 92 MG/DL (ref 70–99)
HCT VFR BLD AUTO: 41.2 % (ref 35–47)
HGB BLD-MCNC: 13.9 G/DL (ref 11.7–15.7)
IMM GRANULOCYTES # BLD: 0 10E3/UL
IMM GRANULOCYTES NFR BLD: 0 %
LIPASE SERPL-CCNC: 27 U/L (ref 13–60)
LYMPHOCYTES # BLD AUTO: 2.6 10E3/UL (ref 0.8–5.3)
LYMPHOCYTES NFR BLD AUTO: 53 %
MCH RBC QN AUTO: 30.1 PG (ref 26.5–33)
MCHC RBC AUTO-ENTMCNC: 33.7 G/DL (ref 31.5–36.5)
MCV RBC AUTO: 89 FL (ref 78–100)
MONOCYTES # BLD AUTO: 0.3 10E3/UL (ref 0–1.3)
MONOCYTES NFR BLD AUTO: 6 %
NEUTROPHILS # BLD AUTO: 1.8 10E3/UL (ref 1.6–8.3)
NEUTROPHILS NFR BLD AUTO: 36 %
NRBC # BLD AUTO: 0 10E3/UL
NRBC BLD AUTO-RTO: 0 /100
PLATELET # BLD AUTO: 269 10E3/UL (ref 150–450)
POTASSIUM SERPL-SCNC: 3.7 MMOL/L (ref 3.4–5.3)
PROT SERPL-MCNC: 7.1 G/DL (ref 6.4–8.3)
RBC # BLD AUTO: 4.62 10E6/UL (ref 3.8–5.2)
RSV RNA SPEC NAA+PROBE: NEGATIVE
SARS-COV-2 RNA RESP QL NAA+PROBE: POSITIVE
SODIUM SERPL-SCNC: 139 MMOL/L (ref 135–145)
WBC # BLD AUTO: 4.9 10E3/UL (ref 4–11)

## 2023-10-03 PROCEDURE — 258N000003 HC RX IP 258 OP 636

## 2023-10-03 PROCEDURE — 36415 COLL VENOUS BLD VENIPUNCTURE: CPT | Performed by: EMERGENCY MEDICINE

## 2023-10-03 PROCEDURE — 84155 ASSAY OF PROTEIN SERUM: CPT | Performed by: EMERGENCY MEDICINE

## 2023-10-03 PROCEDURE — 99284 EMERGENCY DEPT VISIT MOD MDM: CPT | Mod: 25

## 2023-10-03 PROCEDURE — 96375 TX/PRO/DX INJ NEW DRUG ADDON: CPT

## 2023-10-03 PROCEDURE — 87637 SARSCOV2&INF A&B&RSV AMP PRB: CPT

## 2023-10-03 PROCEDURE — 96374 THER/PROPH/DIAG INJ IV PUSH: CPT

## 2023-10-03 PROCEDURE — 83690 ASSAY OF LIPASE: CPT | Performed by: EMERGENCY MEDICINE

## 2023-10-03 PROCEDURE — 85025 COMPLETE CBC W/AUTO DIFF WBC: CPT | Performed by: EMERGENCY MEDICINE

## 2023-10-03 PROCEDURE — 250N000011 HC RX IP 250 OP 636: Mod: JZ

## 2023-10-03 PROCEDURE — 80053 COMPREHEN METABOLIC PANEL: CPT | Performed by: EMERGENCY MEDICINE

## 2023-10-03 PROCEDURE — 96361 HYDRATE IV INFUSION ADD-ON: CPT

## 2023-10-03 RX ORDER — ONDANSETRON 4 MG/1
4 TABLET, ORALLY DISINTEGRATING ORAL ONCE
Status: DISCONTINUED | OUTPATIENT
Start: 2023-10-03 | End: 2023-10-03

## 2023-10-03 RX ORDER — METOCLOPRAMIDE HYDROCHLORIDE 5 MG/ML
10 INJECTION INTRAMUSCULAR; INTRAVENOUS ONCE
Status: COMPLETED | OUTPATIENT
Start: 2023-10-03 | End: 2023-10-03

## 2023-10-03 RX ORDER — HYDROCODONE BITARTRATE AND ACETAMINOPHEN 5; 325 MG/1; MG/1
2 TABLET ORAL ONCE
Status: DISCONTINUED | OUTPATIENT
Start: 2023-10-03 | End: 2023-10-03

## 2023-10-03 RX ORDER — ONDANSETRON 4 MG/1
4 TABLET, ORALLY DISINTEGRATING ORAL EVERY 8 HOURS PRN
Qty: 10 TABLET | Refills: 0 | Status: SHIPPED | OUTPATIENT
Start: 2023-10-03 | End: 2023-10-06

## 2023-10-03 RX ORDER — DEXAMETHASONE SODIUM PHOSPHATE 10 MG/ML
10 INJECTION, SOLUTION INTRAMUSCULAR; INTRAVENOUS ONCE
Status: COMPLETED | OUTPATIENT
Start: 2023-10-03 | End: 2023-10-03

## 2023-10-03 RX ORDER — KETOROLAC TROMETHAMINE 15 MG/ML
15 INJECTION, SOLUTION INTRAMUSCULAR; INTRAVENOUS ONCE
Status: COMPLETED | OUTPATIENT
Start: 2023-10-03 | End: 2023-10-03

## 2023-10-03 RX ORDER — IBUPROFEN 600 MG/1
600 TABLET, FILM COATED ORAL ONCE
Status: DISCONTINUED | OUTPATIENT
Start: 2023-10-03 | End: 2023-10-03

## 2023-10-03 RX ORDER — DIPHENHYDRAMINE HYDROCHLORIDE 50 MG/ML
25 INJECTION INTRAMUSCULAR; INTRAVENOUS ONCE
Status: COMPLETED | OUTPATIENT
Start: 2023-10-03 | End: 2023-10-03

## 2023-10-03 RX ADMIN — KETOROLAC TROMETHAMINE 15 MG: 15 INJECTION INTRAMUSCULAR; INTRAVENOUS at 15:35

## 2023-10-03 RX ADMIN — DIPHENHYDRAMINE HYDROCHLORIDE 25 MG: 50 INJECTION, SOLUTION INTRAMUSCULAR; INTRAVENOUS at 15:35

## 2023-10-03 RX ADMIN — METOCLOPRAMIDE HYDROCHLORIDE 10 MG: 5 INJECTION INTRAMUSCULAR; INTRAVENOUS at 15:34

## 2023-10-03 RX ADMIN — SODIUM CHLORIDE 1000 ML: 9 INJECTION, SOLUTION INTRAVENOUS at 15:35

## 2023-10-03 RX ADMIN — DEXAMETHASONE SODIUM PHOSPHATE 10 MG: 10 INJECTION, SOLUTION INTRAMUSCULAR; INTRAVENOUS at 15:43

## 2023-10-03 ASSESSMENT — ACTIVITIES OF DAILY LIVING (ADL)
ADLS_ACUITY_SCORE: 33
ADLS_ACUITY_SCORE: 35
ADLS_ACUITY_SCORE: 35

## 2023-10-03 NOTE — ED TRIAGE NOTES
Patient arrives ambulatory to triage- patient had left foot surgery on Wednesday. Patient comes in with HA, nausea, weakness, inability to eat. Surgeon called patient for Rx refill but didn't tell her about symptoms. No fevers/chills. Has not removed sutures or bandages to foot.

## 2023-10-03 NOTE — ED PROVIDER NOTES
Emergency Department Attending Supervision Note  10/3/2023  3:49 PM      I evaluated this patient in conjunction with Hiwot SHAIKH      Briefly, the patient presented with  hx of migraines and migriane ha not responsive to home interventions.  Also post op left foot with no new injuries or pain.  Not worst consistent with previous no neuro deficits.        On my exam, well appearing responding to HA medications well.  Pupils normal.  No menigismus.  Strength sensation fuilly intact.  Left lower foot in boot.     Results:  Labs very reassuring.   Covid +  No hypoxia or systemic sx.        My impression is migraine headache, covid +          Tarun Rayo MD Stevens, Andrew C, MD  10/03/23 [id number]       Tarun Rayo MD  10/03/23 [id number]

## 2023-10-03 NOTE — DISCHARGE INSTRUCTIONS
-Take Prilosec as needed for heartburn.  -Follow-up with your PCP and orthopedic surgeon to discuss symptoms and recent COVID diagnosis  -Return to ER in the interim for any new or worsening symptoms.
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
## 2023-10-06 ENCOUNTER — TELEPHONE (OUTPATIENT)
Dept: FAMILY MEDICINE | Facility: CLINIC | Age: 53
End: 2023-10-06

## 2023-10-06 NOTE — TELEPHONE ENCOUNTER
Patient Quality Outreach    Patient is due for the following:   Breast Cancer Screening - Mammogram    Next Steps:   Schedule a Adult Preventative- Mammogram.  Colonoscopy scheduled 11/17/2023.    Type of outreach:    Sent SignNow message.    Next Steps:  Reach out within 90 days via [employer].    Max number of attempts reached: No. Will try again in 90 days if patient still on fail list.    Questions for provider review:    None           Dania Pablo CMA  Chart routed to Care Team.

## 2023-10-14 ENCOUNTER — IMMUNIZATION (OUTPATIENT)
Dept: FAMILY MEDICINE | Facility: CLINIC | Age: 53
End: 2023-10-14
Payer: OTHER GOVERNMENT

## 2023-10-14 PROCEDURE — 90682 RIV4 VACC RECOMBINANT DNA IM: CPT

## 2023-10-14 PROCEDURE — 90471 IMMUNIZATION ADMIN: CPT

## 2023-10-17 ENCOUNTER — MYC MEDICAL ADVICE (OUTPATIENT)
Dept: NEUROLOGY | Facility: CLINIC | Age: 53
End: 2023-10-17
Payer: OTHER GOVERNMENT

## 2023-10-19 DIAGNOSIS — G43.709 CHRONIC MIGRAINE W/O AURA W/O STATUS MIGRAINOSUS, NOT INTRACTABLE: ICD-10-CM

## 2023-10-19 RX ORDER — TOPIRAMATE 25 MG/1
TABLET, FILM COATED ORAL
Qty: 360 TABLET | Refills: 1 | OUTPATIENT
Start: 2023-10-19

## 2023-10-19 NOTE — TELEPHONE ENCOUNTER
Will discuss medication at upcoming appt 10/23.   Poonam YOUNG RN, BSN  [facility]

## 2023-10-19 NOTE — TELEPHONE ENCOUNTER
Sent myChart to get pt scheduled for VV with Elisa, please schedule when she responds.     Poonam YOUNG RN, BSN  [facility]

## 2023-10-22 ASSESSMENT — HEADACHE IMPACT TEST (HIT 6)
HOW OFTEN DO HEADACHES LIMIT YOUR DAILY ACTIVITIES: SOMETIMES
HOW OFTEN HAVE YOU FELT TOO TIRED TO WORK BECAUSE OF YOUR HEADACHES: VERY OFTEN
HOW OFTEN DID HEADACHS LIMIT CONCENTRATION ON WORK OR DAILY ACTIVITY: VERY OFTEN
WHEN YOU HAVE HEADACHES HOW OFTEN IS THE PAIN SEVERE: SOMETIMES
HIT6 TOTAL SCORE: 63
WHEN YOU HAVE A HEADACHE HOW OFTEN DO YOU WISH YOU COULD LIE DOWN: SOMETIMES
HOW OFTEN HAVE YOU FELT FED UP OR IRRITATED BECAUSE OF YOUR HEADACHES: VERY OFTEN

## 2023-10-23 ENCOUNTER — TELEPHONE (OUTPATIENT)
Dept: NEUROLOGY | Facility: CLINIC | Age: 53
End: 2023-10-23

## 2023-10-23 ENCOUNTER — VIRTUAL VISIT (OUTPATIENT)
Dept: NEUROLOGY | Facility: CLINIC | Age: 53
End: 2023-10-23
Payer: OTHER GOVERNMENT

## 2023-10-23 VITALS — BODY MASS INDEX: 26.58 KG/M2 | WEIGHT: 150 LBS | HEIGHT: 63 IN

## 2023-10-23 DIAGNOSIS — G43.709 CHRONIC MIGRAINE W/O AURA W/O STATUS MIGRAINOSUS, NOT INTRACTABLE: ICD-10-CM

## 2023-10-23 DIAGNOSIS — G43.709 CHRONIC MIGRAINE W/O AURA W/O STATUS MIGRAINOSUS, NOT INTRACTABLE: Primary | ICD-10-CM

## 2023-10-23 DIAGNOSIS — G44.40 MEDICATION OVERUSE HEADACHE: ICD-10-CM

## 2023-10-23 PROCEDURE — 99214 OFFICE O/P EST MOD 30 MIN: CPT | Mod: 95

## 2023-10-23 RX ORDER — UBROGEPANT 50 MG/1
TABLET ORAL
Qty: 18 TABLET | Refills: 11 | OUTPATIENT
Start: 2023-10-23

## 2023-10-23 RX ORDER — ONDANSETRON 4 MG/1
4 TABLET, ORALLY DISINTEGRATING ORAL EVERY 8 HOURS PRN
Qty: 30 TABLET | Refills: 3 | Status: SHIPPED | OUTPATIENT
Start: 2023-10-23

## 2023-10-23 ASSESSMENT — PAIN SCALES - GENERAL: PAINLEVEL: SEVERE PAIN (6)

## 2023-10-23 NOTE — PROGRESS NOTES
Virtual Visit Details    Type of service:  Video Visit     Originating Location (pt. Location): Home    Distant Location (provider location):  Off-site  Platform used for Video Visit: [facility]    Headache Neurology Progress Note    October 23, 2023    Subjective:    Dalia presents for follow up of chronic migraine without aura complicated by medication overuse.    At last visit, Dalia reported 30/30 headache days per month with 15+/30 severe headache days per month.     She tried sumatriptan 50 mg but did not tolerate this. She experienced side effects of body heaviness, fast heart beat, neck stiffness, difficulty focusing, feeling like she was going to pass out. She experienced an elevated blood pressure and heart rate, dizziness with rizatriptan 10 mg. Instead, I recommended she try naratriptan 2.5 mg. This caused similar side effects.     She is no longer using naproxen or cyclobenzaprine but is still needing to use Excedrin every day.    Since last visit, she was seen in the ER 10/3/23 for migraine, and also tested positive for Covid at that time. She was experiencing worse headaches while ill.     She tried topiramate, got up to 100 mg nightly for 3 weeks but did not notice any improvement in her headache symptoms. She discontinued topiramate when ill with Covid.     Today, she reports her headaches are back to her baseline and they continue to be daily. She is having a lot of nausea with her headaches. Ondansetron has been helpful for nausea.     From initial encounter:   She describes her headaches as starting at the back of her neck moving up towards the back of her head, wrapping to bilateral temples and around her eyes.  It is a pressure-like pain.  Pain can be worse overnight and she often wakes up with a headache.  Headache is present constantly unless she is taking medications to treat headache. She rates her typical headache as a 5-6/10 and severe headaches  as an 8-9/10 in intensity. She has associated nausea with rare vomiting, fatigue, heat intolerance, and phonophobia.     She is accompanied on video today with her . They are interested in Botox for headache treatment.     Current headache treatments:  Acute therapies:  - Excedrin     Preventative therapies:    Supportive therapies:    Previous treatments tried:  Acute therapies:  - Naproxen  - Cyclobenzaprine   - Sumatriptan 50 mg - side effects  - Rizatriptan 10 mg - side effects  - Naratriptan 2.5 mg - side effects    Preventative therapies:  - Topiramate up to 100 mg nightly - not effective     Supportive therapies:        Objective:    Vitals: LMP 12/01/2020   General: Cooperative, NAD  Neurologic Exam:  Mental Status: Fully alert, attentive and oriented. Speech is clear and fluent.   Cranial Nerves: Facial movements symmetric.   Motor: No abnormal movements.        Assessment and Plan:   Dalia is a 52 year old female who presents for follow up of chronic migraine without aura complicated by medication overuse.     We discussed the following treatment strategy:  - For acute treatment of mild headache, she may use Excedrin as needed, not to exceed 9 days per month to avoid medication overuse. Reviewed risk of medication overuse headache and it is possible the Excedrin is contributing to her nausea, GI upset as well.   - For acute treatment of moderate to severe headache, will see if Ubrelvy 50 mg tablet can be approved. Side effects reviewed.   - For nausea with headache, she may use ondansetron 4 mg ODT as needed.     Her current frequency and severity of headaches warrant prevention.  - She did not have any improvement with topiramate 100 mg so she discontinued this.   - I recommend a trial of botulinum toxin injections following a chronic migraine protocol, administered every 12 weeks. We will work to obtain prior authorization for this.   - Alternatively, could consider propranolol, nortriptyline,  or CGRP inhibitors.     She has an upcoming schedule follow-up appointment with me 11/28/2023. She will keep this appointment.       Elisa Oneill PA-C  Headache Neurology  [facility]

## 2023-10-23 NOTE — LETTER
10/23/2023         RE: Dalia Fried  [address]  [place] [address]        Dear Colleague,    Thank you for referring your patient, Dalia Fried, to the [facility]. Please see a copy of my visit note below.    Virtual Visit Details    Type of service:  Video Visit     Originating Location (pt. Location): Home    Distant Location (provider location):  Off-site  Platform used for Video Visit: [facility]    Headache Neurology Progress Note    October 23, 2023    Subjective:    Dalia presents for follow up of chronic migraine without aura complicated by medication overuse.    At last visit, Dalia reported 30/30 headache days per month with 15+/30 severe headache days per month.     She tried sumatriptan 50 mg but did not tolerate this. She experienced side effects of body heaviness, fast heart beat, neck stiffness, difficulty focusing, feeling like she was going to pass out. She experienced an elevated blood pressure and heart rate, dizziness with rizatriptan 10 mg. Instead, I recommended she try naratriptan 2.5 mg. This caused similar side effects.     She is no longer using naproxen or cyclobenzaprine but is still needing to use Excedrin every day.    Since last visit, she was seen in the ER 10/3/23 for migraine, and also tested positive for Covid at that time. She was experiencing worse headaches while ill.     She tried topiramate, got up to 100 mg nightly for 3 weeks but did not notice any improvement in her headache symptoms. She discontinued topiramate when ill with Covid.     Today, she reports her headaches are back to her baseline and they continue to be daily. She is having a lot of nausea with her headaches. Ondansetron has been helpful for nausea.     From initial encounter:   She describes her headaches as starting at the back of her neck moving up towards the back of her head, wrapping to bilateral temples and  around her eyes.  It is a pressure-like pain.  Pain can be worse overnight and she often wakes up with a headache.  Headache is present constantly unless she is taking medications to treat headache. She rates her typical headache as a 5-6/10 and severe headaches as an 8-9/10 in intensity. She has associated nausea with rare vomiting, fatigue, heat intolerance, and phonophobia.     She is accompanied on video today with her . They are interested in Botox for headache treatment.     Current headache treatments:  Acute therapies:  - Excedrin     Preventative therapies:    Supportive therapies:    Previous treatments tried:  Acute therapies:  - Naproxen  - Cyclobenzaprine   - Sumatriptan 50 mg - side effects  - Rizatriptan 10 mg - side effects  - Naratriptan 2.5 mg - side effects    Preventative therapies:  - Topiramate up to 100 mg nightly - not effective     Supportive therapies:        Objective:    Vitals: LMP 12/01/2020   General: Cooperative, NAD  Neurologic Exam:  Mental Status: Fully alert, attentive and oriented. Speech is clear and fluent.   Cranial Nerves: Facial movements symmetric.   Motor: No abnormal movements.        Assessment and Plan:   Dalia is a 52 year old female who presents for follow up of chronic migraine without aura complicated by medication overuse.     We discussed the following treatment strategy:  - For acute treatment of mild headache, she may use Excedrin as needed, not to exceed 9 days per month to avoid medication overuse. Reviewed risk of medication overuse headache and it is possible the Excedrin is contributing to her nausea, GI upset as well.   - For acute treatment of moderate to severe headache, will see if Ubrelvy 50 mg tablet can be approved. Side effects reviewed.   - For nausea with headache, she may use ondansetron 4 mg ODT as needed.     Her current frequency and severity of headaches warrant prevention.  - She did not have any improvement with topiramate 100 mg  so she discontinued this.   - I recommend a trial of botulinum toxin injections following a chronic migraine protocol, administered every 12 weeks. We will work to obtain prior authorization for this.   - Alternatively, could consider propranolol, nortriptyline, or CGRP inhibitors.     She has an upcoming schedule follow-up appointment with me 11/28/2023. She will keep this appointment.       Pepper Oneill PA-C  Headache Neurology  [facility]      Again, thank you for allowing me to participate in the care of your patient.        Sincerely,        PEPPER ONEILL PA-C

## 2023-10-23 NOTE — Clinical Note
Hello,  Patient has follow up visit with me 11/28 at 2:30 in person - tentatively change this to a Botox appointment as she would like to see if she can get approval through her insurance. If approved, will keep as a Botox appointment. Thanks! Elisa Oneill PA-C

## 2023-10-23 NOTE — NURSING NOTE
Is the patient currently in the state of [place]? YES    Visit mode:VIDEO    If the visit is dropped, the patient can be reconnected by: VIDEO VISIT: Text to cell phone:   Telephone Information:   Mobile [phone]       Will anyone else be joining the visit? NO  (If patient encounters technical issues they should call [phone])    How would you like to obtain your AVS? MyChart    Are changes needed to the allergy or medication list? Yes finishing up hydrocodone from post op on foot     Reason for visit: Follow Up    Yadi LERNER

## 2023-10-23 NOTE — PATIENT INSTRUCTIONS
When you have a headache:  - Excedrin - try to limit to no more than 9 days per month  - Will see if we can get Nurtec or Ubrelvy approved   - Ondansetron (Zofran) for nausea as needed    Headache Devices:  - Cefaly.com  - Nerivio.com  - Relivion.com    Other medications for headache prevention:  - Propranolol  - Nortriptyline   - Emgality, Aimovig, Ajovy (once a month injections)

## 2023-10-23 NOTE — TELEPHONE ENCOUNTER
Prior Authorization Retail Medication Request    Medication/Dose: ubrogepant (UBRELVY) 50 MG tablet  ICD code (if different than what is on RX):    Previously Tried and Failed:  - Naproxen  - Cyclobenzaprine   - Sumatriptan 50 mg - side effects  - Rizatriptan 10 mg - side effects  - Naratriptan 2.5 mg - side effects  Rationale:    Insurance Name:    Insurance ID:        Pharmacy Information (if different than what is on RX)  Name:    Phone:

## 2023-10-25 NOTE — TELEPHONE ENCOUNTER
PA Initiation    Medication: UBRELVY 50 MG PO TABS  Insurance Company: [employer] - Phone [phone] Fax [phone]  Pharmacy Filling the Rx: [id number] - [place], [place] - [address] [address]  Filling Pharmacy Phone: [phone]  Filling Pharmacy Fax:    Start Date: 10/25/2023  Central Prior Authorization Team   Phone: [phone]

## 2023-10-25 NOTE — TELEPHONE ENCOUNTER
PRIOR AUTHORIZATION DENIED    Medication: UBRELVY 50 MG PO TABS  Insurance Company:  - Phone [phone] Fax [phone]  Denial Date:  10/25/2023  Denial Rational: Melissa sees that this patient has other insurance that is primary.  They cannot do a PA until that gets cleared up.  Appeal Information: None  Patient Notified: Yes, I called and told the patient that she has to call [employer] at [phone] to inform them that she does not have any other insurance coverage then we can start a PA.         Per [employer] \"Toya Pavon from [facility] requested a compression pump for patient\".  They need an order and visit note from chart documenting lymphedema.  Order and notes faxed to [phone].  Phone number is [phone]

## 2023-10-31 ENCOUNTER — TELEPHONE (OUTPATIENT)
Dept: NEUROLOGY | Facility: CLINIC | Age: 53
End: 2023-10-31
Payer: OTHER GOVERNMENT

## 2023-10-31 NOTE — TELEPHONE ENCOUNTER
Prior Authorization Approval    Medication: UBRELVY 50 MG PO TABS  Authorization Effective Date: 10/31/2023  Authorization Expiration Date: 4/28/2024  Approved Dose/Quantity:   Reference #:     Insurance Company: [employer] Non-Specialty PA's - Phone [phone] Fax [phone]  Expected CoPay: $    CoPay Card Available:      Financial Assistance Needed:   Which Pharmacy is filling the prescription: [id number] - [place], [place] - [address] [address]  Pharmacy Notified: Yes  Patient Notified: Pharmacy will notify patient.

## 2023-10-31 NOTE — TELEPHONE ENCOUNTER
PA Initiation    Medication: UBRELVY 50 MG PO TABS  Insurance Company: [employer]-Specialty PA's - Phone [phone] Fax [phone]  Pharmacy Filling the Rx: [id number] - [place], [place] - [address] [address]  Filling Pharmacy Phone: [phone]  Filling Pharmacy Fax:    Start Date: 10/31/2023  Central Prior Authorization Team   Phone: [phone]

## 2023-11-15 RX ORDER — IBUPROFEN 200 MG
200 TABLET ORAL EVERY 4 HOURS PRN
COMMUNITY

## 2023-11-16 ENCOUNTER — OFFICE VISIT (OUTPATIENT)
Dept: FAMILY MEDICINE | Facility: CLINIC | Age: 53
End: 2023-11-16
Payer: OTHER GOVERNMENT

## 2023-11-16 VITALS
HEART RATE: 71 BPM | SYSTOLIC BLOOD PRESSURE: 124 MMHG | BODY MASS INDEX: 25.16 KG/M2 | DIASTOLIC BLOOD PRESSURE: 84 MMHG | TEMPERATURE: 97.6 F | OXYGEN SATURATION: 100 % | WEIGHT: 136.7 LBS | HEIGHT: 62 IN | RESPIRATION RATE: 18 BRPM

## 2023-11-16 DIAGNOSIS — N89.8 VAGINAL DRYNESS: ICD-10-CM

## 2023-11-16 DIAGNOSIS — Z12.11 SCREEN FOR COLON CANCER: Primary | ICD-10-CM

## 2023-11-16 PROCEDURE — 99213 OFFICE O/P EST LOW 20 MIN: CPT | Performed by: NURSE PRACTITIONER

## 2023-11-16 RX ORDER — ESTRADIOL 0.1 MG/G
CREAM VAGINAL
Qty: 42.5 G | Refills: 4 | Status: SHIPPED | OUTPATIENT
Start: 2023-11-17

## 2023-11-16 RX ORDER — ESTRADIOL 0.1 MG/G
CREAM VAGINAL
Qty: 42.5 G | Refills: 4 | Status: SHIPPED | OUTPATIENT
Start: 2023-11-17 | End: 2023-11-16

## 2023-11-16 NOTE — PROGRESS NOTES
"  Assessment & Plan     Screen for colon cancer  Coming up appointment    Vaginal dryness  - will do trial of vaginal estrogen  - estradiol (ESTRACE) 0.1 MG/GM vaginal cream  Dispense: 42.5 g; Refill: 4        Isabel Velazquez NP  [facility]z is a 52 year old, presenting for the following health issues:  Menopausal Sx and Recheck Medication        11/16/2023     3:30 PM   Additional Questions   Roomed by Safia       History of Present Illness       Reason for visit:  Follow upShe consumes 1 sweetened beverage(s) daily.She exercises with enough effort to increase her heart rate 30 to 60 minutes per day.  She exercises with enough effort to increase her heart rate 3 or less days per week.   She is taking medications regularly.         Concern - following up on recent medications  Meds are helping      Review of Systems   Constitutional, HEENT, cardiovascular, pulmonary, gi and gu systems are negative, except as otherwise noted.      Objective    /84   Pulse 71   Temp 97.6  F (36.4  C) (Tympanic)   Resp 18   Ht 1.575 m (5' 2\")   Wt 62 kg (136 lb 11.2 oz)   LMP 12/01/2020   SpO2 100%   BMI 25.00 kg/m    Body mass index is 25 kg/m .  Physical Exam   GENERAL: healthy, alert and no distress  NECK: no adenopathy, no asymmetry, masses, or scars and thyroid normal to palpation  RESP: lungs clear to auscultation - no rales, rhonchi or wheezes  CV: regular rate and rhythm, normal S1 S2, no S3 or S4, no murmur, click or rub, no peripheral edema and peripheral pulses strong  ABDOMEN: soft, nontender, no hepatosplenomegaly, no masses and bowel sounds normal  MS: no gross musculoskeletal defects noted, no edema                      "

## 2023-11-17 ENCOUNTER — HOSPITAL ENCOUNTER (OUTPATIENT)
Facility: CLINIC | Age: 53
Discharge: HOME | End: 2023-11-17
Attending: INTERNAL MEDICINE | Admitting: INTERNAL MEDICINE
Payer: OTHER GOVERNMENT

## 2023-11-17 VITALS
DIASTOLIC BLOOD PRESSURE: 103 MMHG | RESPIRATION RATE: 16 BRPM | TEMPERATURE: 97 F | OXYGEN SATURATION: 99 % | HEART RATE: 82 BPM | SYSTOLIC BLOOD PRESSURE: 134 MMHG

## 2023-11-17 LAB — COLONOSCOPY: NORMAL

## 2023-11-17 PROCEDURE — 250N000011 HC RX IP 250 OP 636: Performed by: INTERNAL MEDICINE

## 2023-11-17 PROCEDURE — 45378 DIAGNOSTIC COLONOSCOPY: CPT | Performed by: INTERNAL MEDICINE

## 2023-11-17 PROCEDURE — G0500 MOD SEDAT ENDO SERVICE >5YRS: HCPCS | Performed by: INTERNAL MEDICINE

## 2023-11-17 PROCEDURE — G0121 COLON CA SCRN NOT HI RSK IND: HCPCS | Performed by: INTERNAL MEDICINE

## 2023-11-17 RX ORDER — NALOXONE HYDROCHLORIDE 0.4 MG/ML
0.2 INJECTION, SOLUTION INTRAMUSCULAR; INTRAVENOUS; SUBCUTANEOUS
Status: DISCONTINUED | OUTPATIENT
Start: 2023-11-17 | End: 2023-11-17 | Stop reason: HOSPADM

## 2023-11-17 RX ORDER — ONDANSETRON 4 MG/1
4 TABLET, ORALLY DISINTEGRATING ORAL EVERY 6 HOURS PRN
Status: DISCONTINUED | OUTPATIENT
Start: 2023-11-17 | End: 2023-11-17 | Stop reason: HOSPADM

## 2023-11-17 RX ORDER — SIMETHICONE 40MG/0.6ML
133 SUSPENSION, DROPS(FINAL DOSAGE FORM)(ML) ORAL
Status: DISCONTINUED | OUTPATIENT
Start: 2023-11-17 | End: 2023-11-17 | Stop reason: HOSPADM

## 2023-11-17 RX ORDER — NALOXONE HYDROCHLORIDE 0.4 MG/ML
0.4 INJECTION, SOLUTION INTRAMUSCULAR; INTRAVENOUS; SUBCUTANEOUS
Status: DISCONTINUED | OUTPATIENT
Start: 2023-11-17 | End: 2023-11-17 | Stop reason: HOSPADM

## 2023-11-17 RX ORDER — ATROPINE SULFATE 0.1 MG/ML
1 INJECTION INTRAVENOUS
Status: DISCONTINUED | OUTPATIENT
Start: 2023-11-17 | End: 2023-11-17 | Stop reason: HOSPADM

## 2023-11-17 RX ORDER — FLUMAZENIL 0.1 MG/ML
0.2 INJECTION, SOLUTION INTRAVENOUS
Status: DISCONTINUED | OUTPATIENT
Start: 2023-11-17 | End: 2023-11-17 | Stop reason: HOSPADM

## 2023-11-17 RX ORDER — LIDOCAINE 40 MG/G
CREAM TOPICAL
Status: DISCONTINUED | OUTPATIENT
Start: 2023-11-17 | End: 2023-11-17 | Stop reason: HOSPADM

## 2023-11-17 RX ORDER — ONDANSETRON 2 MG/ML
4 INJECTION INTRAMUSCULAR; INTRAVENOUS
Status: DISCONTINUED | OUTPATIENT
Start: 2023-11-17 | End: 2023-11-17 | Stop reason: HOSPADM

## 2023-11-17 RX ORDER — FENTANYL CITRATE 50 UG/ML
50-100 INJECTION, SOLUTION INTRAMUSCULAR; INTRAVENOUS EVERY 5 MIN PRN
Status: DISCONTINUED | OUTPATIENT
Start: 2023-11-17 | End: 2023-11-17 | Stop reason: HOSPADM

## 2023-11-17 RX ORDER — PROCHLORPERAZINE MALEATE 10 MG
10 TABLET ORAL EVERY 6 HOURS PRN
Status: DISCONTINUED | OUTPATIENT
Start: 2023-11-17 | End: 2023-11-17 | Stop reason: HOSPADM

## 2023-11-17 RX ORDER — ONDANSETRON 2 MG/ML
4 INJECTION INTRAMUSCULAR; INTRAVENOUS EVERY 6 HOURS PRN
Status: DISCONTINUED | OUTPATIENT
Start: 2023-11-17 | End: 2023-11-17 | Stop reason: HOSPADM

## 2023-11-17 RX ORDER — DIPHENHYDRAMINE HYDROCHLORIDE 50 MG/ML
25-50 INJECTION INTRAMUSCULAR; INTRAVENOUS
Status: DISCONTINUED | OUTPATIENT
Start: 2023-11-17 | End: 2023-11-17 | Stop reason: HOSPADM

## 2023-11-17 RX ORDER — EPINEPHRINE 1 MG/ML
0.1 INJECTION, SOLUTION INTRAMUSCULAR; SUBCUTANEOUS
Status: DISCONTINUED | OUTPATIENT
Start: 2023-11-17 | End: 2023-11-17 | Stop reason: HOSPADM

## 2023-11-17 RX ADMIN — FENTANYL CITRATE 50 MCG: 50 INJECTION, SOLUTION INTRAMUSCULAR; INTRAVENOUS at 15:39

## 2023-11-17 RX ADMIN — FENTANYL CITRATE 50 MCG: 50 INJECTION, SOLUTION INTRAMUSCULAR; INTRAVENOUS at 15:41

## 2023-11-17 RX ADMIN — FENTANYL CITRATE 100 MCG: 50 INJECTION, SOLUTION INTRAMUSCULAR; INTRAVENOUS at 15:35

## 2023-11-17 RX ADMIN — MIDAZOLAM 2 MG: 1 INJECTION INTRAMUSCULAR; INTRAVENOUS at 15:35

## 2023-11-17 RX ADMIN — MIDAZOLAM 1 MG: 1 INJECTION INTRAMUSCULAR; INTRAVENOUS at 15:41

## 2023-11-17 RX ADMIN — MIDAZOLAM 1 MG: 1 INJECTION INTRAMUSCULAR; INTRAVENOUS at 15:39

## 2023-11-17 ASSESSMENT — ACTIVITIES OF DAILY LIVING (ADL): ADLS_ACUITY_SCORE: 33

## 2023-11-17 NOTE — H&P
Pre-Endoscopy History and Physical     Dalia Fried MRN# [id number]   YOB: 1970 Age: 52 year old     Date of Procedure: 11/17/2023  Primary care provider: Isabel Velazquez  Type of Endoscopy: Colonoscopy with possible biopsy, possible polypectomy  Reason for Procedure: screen  Type of Anesthesia Anticipated: Conscious Sedation    HPI:    Dalia is a 52 year old female who will be undergoing the above procedure.      A history and physical has been performed. The patient's medications and allergies have been reviewed. The risks and benefits of the procedure and the sedation options and risks were discussed with the patient.  All questions were answered and informed consent was obtained.      She denies a personal or family history of anesthesia complications or bleeding disorders.     Patient Active Problem List   Diagnosis    Benign paroxysmal positional vertigo    Epidemic vertigo    Head and neck malignancy (H)    Toe pain, left    Cervical high risk HPV (human papillomavirus) test positive        Past Medical History:   Diagnosis Date    Head and neck malignancy (H) 6/21/2023    Hearing loss         Past Surgical History:   Procedure Laterality Date    COSMETIC SURGERY  2002    Blast    REPAIR HAMMER TOE         Social History     Tobacco Use    Smoking status: Never    Smokeless tobacco: Never   Substance Use Topics    Alcohol use: Not Currently     Comment: occ       Family History   Problem Relation Age of Onset    Diabetes Father     Thyroid Disease Father     Colon Cancer No family hx of        Prior to Admission medications    Medication Sig Start Date End Date Taking? Authorizing Provider   aspirin-acetaminophen-caffeine (EXCEDRIN MIGRAINE) 250-250-65 MG tablet Take 1 tablet by mouth daily as needed for headaches   Yes Reported, Patient   ibuprofen (ADVIL/MOTRIN) 200 MG tablet Take 200 mg by mouth every 4 hours as needed for pain   Yes Reported, Patient   ondansetron (ZOFRAN  "ODT) 4 MG ODT tab Take 1 tablet (4 mg) by mouth every 8 hours as needed for nausea 10/23/23  Yes Elisa Oneill PA-C   phentermine (ADIPEX-P) 37.5 MG capsule Take 1 capsule (37.5 mg) by mouth every morning 8/7/23  Yes Isabel Velazquez, NP   estradiol (ESTRACE) 0.1 MG/GM vaginal cream Apply 4 g every night x 2 weeks then change to 4 g at bedtime 3 times a week 11/17/23   Isabel Velazquez, NP       Allergies   Allergen Reactions    No Known Allergies         REVIEW OF SYSTEMS:   5 point ROS negative except as noted above in HPI, including Gen., Resp., CV, GI &  system review.    PHYSICAL EXAM:   BP (!) 134/97 (BP Location: Left arm)   Pulse 60   Temp 97  F (36.1  C) (Temporal)   Resp 15   LMP 12/01/2020   SpO2 100%  Estimated body mass index is 25 kg/m  as calculated from the following:    Height as of 11/16/23: 1.575 m (5' 2\").    Weight as of 11/16/23: 62 kg (136 lb 11.2 oz).   GENERAL APPEARANCE: alert, and oriented  MENTAL STATUS: alert  AIRWAY EXAM: Mallampatti Class I (visualization of the soft palate, fauces, uvula, anterior and posterior pillars)  RESP: lungs clear to auscultation - no rales, rhonchi or wheezes  CV: regular rates and rhythm  DIAGNOSTICS:    Not indicated    IMPRESSION   ASA Class 2 - Mild systemic disease    PLAN:   Plan for Colonoscopy with possible biopsy, possible polypectomy. We discussed the risks, benefits and alternatives and the patient wished to proceed.    The above has been forwarded to the consulting provider.      Signed Electronically by: Bronson Jacobson MD  November 17, 2023          "

## 2023-11-18 ENCOUNTER — ANCILLARY PROCEDURE (OUTPATIENT)
Dept: MAMMOGRAPHY | Facility: CLINIC | Age: 53
End: 2023-11-18
Attending: NURSE PRACTITIONER
Payer: OTHER GOVERNMENT

## 2023-11-18 DIAGNOSIS — Z12.31 VISIT FOR SCREENING MAMMOGRAM: ICD-10-CM

## 2023-11-18 PROCEDURE — 77063 BREAST TOMOSYNTHESIS BI: CPT | Mod: TC | Performed by: RADIOLOGY

## 2023-11-18 PROCEDURE — 77067 SCR MAMMO BI INCL CAD: CPT | Mod: TC | Performed by: RADIOLOGY

## 2023-11-21 ASSESSMENT — HEADACHE IMPACT TEST (HIT 6)
HOW OFTEN DID HEADACHS LIMIT CONCENTRATION ON WORK OR DAILY ACTIVITY: SOMETIMES
WHEN YOU HAVE A HEADACHE HOW OFTEN DO YOU WISH YOU COULD LIE DOWN: ALWAYS
HOW OFTEN HAVE YOU FELT FED UP OR IRRITATED BECAUSE OF YOUR HEADACHES: ALWAYS
HOW OFTEN HAVE YOU FELT TOO TIRED TO WORK BECAUSE OF YOUR HEADACHES: SOMETIMES
WHEN YOU HAVE HEADACHES HOW OFTEN IS THE PAIN SEVERE: ALWAYS
HOW OFTEN DO HEADACHES LIMIT YOUR DAILY ACTIVITIES: ALWAYS
HIT6 TOTAL SCORE: 72

## 2023-11-28 ENCOUNTER — OFFICE VISIT (OUTPATIENT)
Dept: NEUROLOGY | Facility: CLINIC | Age: 53
End: 2023-11-28
Payer: OTHER GOVERNMENT

## 2023-11-28 DIAGNOSIS — G43.709 CHRONIC MIGRAINE W/O AURA W/O STATUS MIGRAINOSUS, NOT INTRACTABLE: Primary | ICD-10-CM

## 2023-11-28 PROCEDURE — 64615 CHEMODENERV MUSC MIGRAINE: CPT

## 2023-11-28 NOTE — LETTER
11/28/2023         RE: Dalia Fried  [id number] [facility] [address]        Dear Colleague,    Thank you for referring your patient, Dalia Fried, to the [facility]. Please see a copy of my visit note below.    [facility]  Botulinum Toxin Procedure    Elisa Oneill PA-C  Headache Neurology    November 28, 2023    Procedure: OnabotulinumtoxinA injections for chronic migraine  Indication: Chronic migraine    Dalia Fried suffers from severe intractable headaches. She was referred by Elisa Oneill PA-C for onabotulinumtoxinA injections for headache.      Prior to initiation of botulinum toxin injections, Dalia reported 30/30 headache days per month, with 15+/30 severe headache days per month. Her headaches are quite disabling and often interfere with her ability to function normally.    Her headaches meet criteria for chronic migraine. At baseline, she describes her headaches as starting at the back of her neck moving up towards the back of her head, wrapping to bilateral temples and around her eyes.  It is a pressure-like pain.  Pain can be worse overnight and she often wakes up with a headache.  Headache is present constantly unless she is taking medications to treat headache. She rates her typical headache as a 5-6/10 and severe headaches as an 8-9/10 in intensity. She has associated nausea with rare vomiting, fatigue, heat intolerance, and phonophobia.      This is her first round of botulinum toxin injections today.    She has attempted other migraine prophylactic treatments in the past, which have included: topiramate.     Ubrelvy is not very effective. Excedrin tension is helpful if needed.     Patient's pain was assessed prior to the procedure. She rated her pain today as 3 out of 10.      The procedure was explained to the patient. Benefits of the treatment were discussed including headache and migraine reduction. Risks of the procedure were  reviewed including but not limited to pain, bruising, bleeding, infection, and weakness of muscles injected or those distal to injection sites. Alternatives were discussed. The patient voiced understanding of the risks and benefits. All questions answered and patient consented to proceed.    Procedural Pause: Procedural pause was conducted to verify correct patient identity, procedure to be performed, correct side and site, correct patient position, and special requirements. Appropriate hand hygiene was utilized, and each injection site was prepped with alcohol wipes or Chloraprep swab.     Procedure Details:   200 units of onabotulinumtoxinA was diluted in 4 mL 0.9% normal saline.   A total of 150 units of onabotulinumtoxinA were injected using 30 gauge 0.5 inch needles into the muscles listed below. 50 units of onabotulinumtoxinA were wasted.     Injection Sites: Total = 150 units onabotulinumtoxinA     Procerus muscles - 5 units into the procerus muscle (5 units total)     muscles - 5 units into the left  muscle and 5 units into the right  muscle (1 injection site per muscle) (10 units total)    Frontalis muscles - 5 units into the left superior frontalis muscle and 5 units into the right superior frontalis muscle (2 injection sites per muscle) (10 units total)    Temporalis muscles - 12.5 units into the left temporalis muscle and 12.5 units into the right temporalis muscle (2 injection sites per muscle) (25 units total)    Occipitalis muscles - 12.5 units into the left occipitalis muscle and 12.5 units into the right occipitalis muscle (2 injection sites per muscle) (25 units total)    Splenius Capitis muscles - 12.5 units into the left splenius capitis muscle and 12.5 units into the right splenius capitis muscle (2 injection sites per muscle, divided into 2/3 anteriorly and 1/3 posteriorly) (25 units total)      Trapezius muscles - 25 units into the left trapezius muscle and 25  units into the right trapezius muscle (3 injection sites per muscle, divided into 5 units, 10 units, 10 units, medial to lateral) (50 units total)      Patient tolerated the procedure well without immediate complications. She will follow up in clinic for assessment of the effectiveness of treatment. She did not report any change in her pain level after the botulinumtoxinA injection procedure.      Pepper Oneill PA-C  Headache Neurology  [facility] Neurology [facility]       Again, thank you for allowing me to participate in the care of your patient.        Sincerely,        PEPPER ONEILL PA-C

## 2023-11-28 NOTE — PROGRESS NOTES
[facility]  Botulinum Toxin Procedure    Elisa Oneill PA-C  Headache Neurology    November 28, 2023    Procedure: OnabotulinumtoxinA injections for chronic migraine  Indication: Chronic migraine    Dalia Fried suffers from severe intractable headaches. She was referred by Elisa Oneill PA-C for onabotulinumtoxinA injections for headache.      Prior to initiation of botulinum toxin injections, Dalia reported 30/30 headache days per month, with 15+/30 severe headache days per month. Her headaches are quite disabling and often interfere with her ability to function normally.    Her headaches meet criteria for chronic migraine. At baseline, she describes her headaches as starting at the back of her neck moving up towards the back of her head, wrapping to bilateral temples and around her eyes.  It is a pressure-like pain.  Pain can be worse overnight and she often wakes up with a headache.  Headache is present constantly unless she is taking medications to treat headache. She rates her typical headache as a 5-6/10 and severe headaches as an 8-9/10 in intensity. She has associated nausea with rare vomiting, fatigue, heat intolerance, and phonophobia.      This is her first round of botulinum toxin injections today.    She has attempted other migraine prophylactic treatments in the past, which have included: topiramate.     Ubrelvy is not very effective. Excedrin tension is helpful if needed.     Patient's pain was assessed prior to the procedure. She rated her pain today as 3 out of 10.      The procedure was explained to the patient. Benefits of the treatment were discussed including headache and migraine reduction. Risks of the procedure were reviewed including but not limited to pain, bruising, bleeding, infection, and weakness of muscles injected or those distal to injection sites. Alternatives were discussed. The patient voiced understanding of the risks and benefits. All questions answered and  patient consented to proceed.    Procedural Pause: Procedural pause was conducted to verify correct patient identity, procedure to be performed, correct side and site, correct patient position, and special requirements. Appropriate hand hygiene was utilized, and each injection site was prepped with alcohol wipes or Chloraprep swab.     Procedure Details:   200 units of onabotulinumtoxinA was diluted in 4 mL 0.9% normal saline.   A total of 150 units of onabotulinumtoxinA were injected using 30 gauge 0.5 inch needles into the muscles listed below. 50 units of onabotulinumtoxinA were wasted.     Injection Sites: Total = 150 units onabotulinumtoxinA     Procerus muscles - 5 units into the procerus muscle (5 units total)     muscles - 5 units into the left  muscle and 5 units into the right  muscle (1 injection site per muscle) (10 units total)    Frontalis muscles - 5 units into the left superior frontalis muscle and 5 units into the right superior frontalis muscle (2 injection sites per muscle) (10 units total)    Temporalis muscles - 12.5 units into the left temporalis muscle and 12.5 units into the right temporalis muscle (2 injection sites per muscle) (25 units total)    Occipitalis muscles - 12.5 units into the left occipitalis muscle and 12.5 units into the right occipitalis muscle (2 injection sites per muscle) (25 units total)    Splenius Capitis muscles - 12.5 units into the left splenius capitis muscle and 12.5 units into the right splenius capitis muscle (2 injection sites per muscle, divided into 2/3 anteriorly and 1/3 posteriorly) (25 units total)      Trapezius muscles - 25 units into the left trapezius muscle and 25 units into the right trapezius muscle (3 injection sites per muscle, divided into 5 units, 10 units, 10 units, medial to lateral) (50 units total)      Patient tolerated the procedure well without immediate complications. She will follow up in clinic for  assessment of the effectiveness of treatment. She did not report any change in her pain level after the botulinumtoxinA injection procedure.      Elisa Oneill PA-C  Headache Neurology  [facility] Neurology [facility]

## 2023-12-28 ENCOUNTER — MYC MEDICAL ADVICE (OUTPATIENT)
Dept: FAMILY MEDICINE | Facility: CLINIC | Age: 53
End: 2023-12-28
Payer: OTHER GOVERNMENT

## 2023-12-28 DIAGNOSIS — N95.0 POST-MENOPAUSAL BLEEDING: Primary | ICD-10-CM

## 2024-01-19 ENCOUNTER — ANCILLARY PROCEDURE (OUTPATIENT)
Dept: ULTRASOUND IMAGING | Facility: CLINIC | Age: 54
End: 2024-01-19
Attending: NURSE PRACTITIONER
Payer: OTHER GOVERNMENT

## 2024-01-19 DIAGNOSIS — N95.0 POST-MENOPAUSAL BLEEDING: ICD-10-CM

## 2024-01-19 PROCEDURE — 76830 TRANSVAGINAL US NON-OB: CPT | Performed by: OBSTETRICS & GYNECOLOGY

## 2024-01-19 PROCEDURE — 76856 US EXAM PELVIC COMPLETE: CPT | Performed by: OBSTETRICS & GYNECOLOGY

## 2024-01-21 DIAGNOSIS — R93.5 ABNORMAL ULTRASOUND OF UTERUS: Primary | ICD-10-CM

## 2024-01-22 DIAGNOSIS — G43.709 CHRONIC MIGRAINE W/O AURA W/O STATUS MIGRAINOSUS, NOT INTRACTABLE: Primary | ICD-10-CM

## 2024-02-05 ENCOUNTER — OFFICE VISIT (OUTPATIENT)
Dept: OBGYN | Facility: CLINIC | Age: 54
End: 2024-02-05
Payer: OTHER GOVERNMENT

## 2024-02-05 VITALS — BODY MASS INDEX: 24.34 KG/M2 | SYSTOLIC BLOOD PRESSURE: 122 MMHG | DIASTOLIC BLOOD PRESSURE: 86 MMHG | WEIGHT: 133.1 LBS

## 2024-02-05 DIAGNOSIS — L90.0 LICHEN SCLEROSUS: ICD-10-CM

## 2024-02-05 DIAGNOSIS — N95.0 POSTMENOPAUSAL BLEEDING: Primary | ICD-10-CM

## 2024-02-05 DIAGNOSIS — N95.8 GENITOURINARY SYNDROME OF MENOPAUSE: ICD-10-CM

## 2024-02-05 DIAGNOSIS — R93.5 ABNORMAL ULTRASOUND OF UTERUS: ICD-10-CM

## 2024-02-05 DIAGNOSIS — N95.1 VAGINAL DRYNESS, MENOPAUSAL: ICD-10-CM

## 2024-02-05 PROCEDURE — 88305 TISSUE EXAM BY PATHOLOGIST: CPT | Performed by: PATHOLOGY

## 2024-02-05 PROCEDURE — 58100 BIOPSY OF UTERUS LINING: CPT | Performed by: STUDENT IN AN ORGANIZED HEALTH CARE EDUCATION/TRAINING PROGRAM

## 2024-02-05 PROCEDURE — 99204 OFFICE O/P NEW MOD 45 MIN: CPT | Mod: 25 | Performed by: STUDENT IN AN ORGANIZED HEALTH CARE EDUCATION/TRAINING PROGRAM

## 2024-02-05 RX ORDER — CLOBETASOL PROPIONATE 0.5 MG/G
OINTMENT TOPICAL
Qty: 30 G | Refills: 1 | Status: SHIPPED | OUTPATIENT
Start: 2024-02-05 | End: 2024-03-15

## 2024-02-05 NOTE — PROGRESS NOTES
[facility]  Gynecology Office Visit    CC: PMB    S:  Dalia Fried is a 53 year old postmenopausal  who presents for evaluation of postmenopausal bleeding. She is here alone. She moved from [place] about a year ago.    Called PCP in Dec 2023 reporting 4 days of PMB. US was ordered which showed EMS of 1.1 mm with fluid in the uterine cavity. She was referred for endometrial biopsy. She has had second episode of PMB in January that last 2 days.     LMP Patient's last menstrual period was 2020.   No bleeding or abnormal discharge until now.  Not having any hot flashes.  ?Unclear if she ever used HRT. She stated no, but note from 23 states that she was and stopped taking.    She has been using estrace cream for vaginal dryness/GSM since 2023. The dose was started 2g a night and then tapered and was recently increased to 4g a night followed by taper due to ongoing dryness symptoms.   - She still feels dryness  - Feels that her vaginal opening has narrowed  - Has pain with insertion during intercourse but then it is ok  - Douches daily in the shower using handheld shower head nozzle    Obstetric History:   - CS x4    Gynecologic History:   - LMP: Patient's last menstrual period was 2020.   - Menses: Absent  - Sexually active: Yes. Partner(s):   - Contraception: Postmenopausal  - Last pap:  - NILM HPV OHR+  - Menopausal symptoms: Vaginal dryness  - Hormone replacement: Possibly? See above/note from 23    Past Medical History:  Past Medical History:   Diagnosis Date    Head and neck malignancy (H) 2023    Hearing loss        Problem List:  Patient Active Problem List   Diagnosis    Benign paroxysmal positional vertigo    Epidemic vertigo    Head and neck malignancy (H)    Toe pain, left    Cervical high risk HPV (human papillomavirus) test positive       Past Surgical History:   Past Surgical History:   Procedure Laterality Date    COLONOSCOPY N/A  11/17/2023    Procedure: Colonoscopy;  Surgeon: Bronson Jacobson MD;  Location:  GI    COSMETIC SURGERY  2002    Blast    REPAIR HAMMER TOE         Medications:  Current Outpatient Medications   Medication Instructions    aspirin-acetaminophen-caffeine (EXCEDRIN MIGRAINE) 250-250-65 MG tablet 1 tablet, Oral, DAILY PRN    estradiol (ESTRACE) 0.1 MG/GM vaginal cream Apply 4 g every night x 2 weeks then change to 4 g at bedtime 3 times a week    ibuprofen (ADVIL/MOTRIN) 200 mg, Oral, EVERY 4 HOURS PRN    ondansetron (ZOFRAN ODT) 4 mg, Oral, EVERY 8 HOURS PRN    phentermine (ADIPEX-P) 37.5 mg, Oral, EVERY MORNING       Allergies:  Allergies   Allergen Reactions    No Known Allergies        Social History:   Social History     Tobacco Use    Smoking status: Never    Smokeless tobacco: Never   Vaping Use    Vaping Use: Never used   Substance Use Topics    Alcohol use: Not Currently     Comment: occ    Drug use: Never       Review of Systems:  A 10-point review of systems was performed and is negative except as per HPI.    O:  /86 (BP Location: Left arm, Cuff Size: Adult Regular)   Wt 60.4 kg (133 lb 1.6 oz)   LMP 12/01/2020   BMI 24.34 kg/m      Exam:  GEN: Appears well, NAD  CV: Well perfused  PULM: NWOB  ABD: Soft, non-tender, non-distended, BS+  : Atrophic external genitalia. Tissue surrounding vaginal introitus just distal to hymen whitish in color. Tissue surrounding urethra orifice also whitened. Vaginal introitus somewhat narrowed. Vagina normal. Cervix normal. Normal discharge, no lesions or bleeding. Exam chaperoned by Prudence Benitez MA     Procedure: Endometrial Biopsy  The speculum was placed and cervix visualized. Cervix cleansed with betadine x3. Tenaculum placed on anterior lip of cervix. Pipelle was inserted and sounded to 7.5 cm. Pipelle was rotated and moderate amount of tissue removed. This was repeated x1 with minimal tissue return. The tenaculum was removed. Excellent hemostasis with  pressure and silver nitrate. Patient tolerated procedure well. EBL <1 ml. Procedure chaperoned by Prudence Benitez MA    Imaging:  Pelvic US 1/19/24  CLINICAL INFORMATION     Indications for ultrasound:   Bleeding/Menses - Post kishan bleeding     LMP: post kishan     Hormones: none     Measurements:  Uterus:  7.1 x 5.1 x 3.4  cm     Position is anteverted.  Very small fibroids noted: 1 Posterior 0.7 x 0.6 x 0.7 cm and 2 Right Posterior 0.7 x 0.7 x 0.6 cm.     Endo cav: 1.1 mm       Smooth/regular/within normal limits  Small amount of fluid within the uterine cavity     Right ovary: 1.1 x 1.2 x 1.9 cm  Wnl  Left ovary:   2.0 x 1.2 x 1.5  cm Wnl     Cul de sac: no free fluid     Technique: Transvaginal Imaging performed  Transabdominal Imaging performed     ===================================  Complete pelvic ultrasound using realtime   transabdominal and transvaginal scanning.  Bladder appears normal.     Normal uterus  Normal bilateral ovaries  Normal endometrial lining where measured, but with a small amount of fluid seen in the cavity as well.  No free fluid in the cul de sac     Consider gynecologic consultation regarding these ultrasound findings.  Would give strong consideration to endometrial evaluation.      A/P:  Dalia Fried is a 53 year old who presents for PMB. She also has vaginal dryness/GSM, in addition to suspected lichen sclerosis diagnosed today..    #PMB  - Endometrial biopsy performed today  - Discussed bleeding may be related to high dose of estrace cream but cannot say definitively  - Discussed need to rule out malignancy  - Will follow-up with results and next steps which may include expectant, medical, surgical (e.g. hysteroscopy, hysterectomy), or referral to gynecologic oncology    #Lichen sclerosis  - Discussed that lichen sclerosus is a chronic, remitting and relapsing skin condition that affects the vulvar skin and is a relatively common cause of vulvar itching, irritation, and loss of  normal architecture. It is also a frequent cause of painful intercourse.  - She may also have GSM but I suspect her symptoms are related to lichen and this would explain why increasing doses of estrace cream were needed  - Rx for Clobetasol 0.05% with instructions to use 1/2 FTU nightly for 4 weeks, then every other night for 4 weeks, then twice weekly  - Soak and and seal with warm bath and vaseline for discomfort  - RTC in 1 month to assess response    #GSM  #Vaginal Dryness  - Start vaginal moisturizer (Replens)  - Stop vaginal estrace cream. Discussed we can likely restart estrace in the future, but recommend trial with Clobetasol first to see how symptoms respond  - Reviewed vulvar hygiene and recommend that she stop douching with shower nozzle    Cody Childs MD MPH  02/05/2024 11:45 AM

## 2024-02-05 NOTE — PATIENT INSTRUCTIONS
Clobetasol (hyperpotent topical steroid) Use    - Use 1/2 fingertip unit (from the tip of your index finger to the first crease) clobetasol ointment in a thin layer applied to the vulva nightly for 4 weeks, every other night for 4 weeks, and then twice weekly.    - Do not apply ointment to any areas that have hair. Do no put any ointment into your vagina    - Most patients will need to continue the clobetasol twice weekly on an ongoing basis to prevent worsening of symptoms. Ongoing use also decreases your risk of the Lichen Sclerosus evolving into skin cancer, which is very rare.     - If you ever develop worsening symptoms increase the use of the ointment again to day for 7-10 days, then decrease to twice a week again. If you have worsening symptoms that do not respond to a burst of treatment as described, please return for evaluation.    - Schedule a follow up appointment in 2 months to monitor response to treatment.     Vaseline    - Soak vulva in hot water (or apply a hot, wet towel) for 15 to 20 minutes. Afterwards apply vaseline to any areas of irritation. May do this several times a day.    Vaginal Moisturizer    - Use a vaginal moisturizer (I recommend Replens) to help with vaginal dryness and itching    ==================    - Stop using vaginal estrogen  - Make a follow-up appointment in 2 months, sooner if issues    ==================    Lichen Sclerosis    - Lichen sclerosis is a chronic condition that occurs most often in postmenopausal women, usually involving itching, burning, and pain at the vulva. It likely occurs in 1 in 50 post-menopausal women. It is a benign, chronic, progressive skin condition characterized by inflammation, skin thinning, and distinctive change in the skin architecture of the vulva. The course usually includes frequent recurrences and remissions of signs and symptoms.  - Although the risk of vulvar squamous cell carcinoma in women with vulvar Lichen Sclerosis is increased,  this risk is estimated to be less than 5 percent. The vulva should be examined at least yearly for the remainder of your life and nonresolving lesions of localized thickened skin should be biopsied.  - The goals of treatment is resolution of the symptoms (itching and pain) and signs of disease, including thickened skin, fissuring, and bruising.   - Topical steroid ointment treatment is a highly effective therapy that can prevent progression of the disease. It is important to avoid scratching the area to prevent further progression.  - Most patients will need to continue the clobetasol twice weekly on an ongoing basis to prevent worsening of symptoms. Ongoing use also decreases your risk of the Lichen Sclerosus evolving into skin cancer, which is very rare.    - You should examine the vulvar area with a mirror and fingertips on a monthly basis and should return for evaluation if you see thickened areas of skin or sores that do not heal  - If you ever develop worsening symptoms increase the use of the ointment again to day for 7-10 days, then decrease to twice a week again. If you have worsening symptoms that do not respond to a burst of treatment as described, please return for evaluation

## 2024-02-05 NOTE — NURSING NOTE
"Chief Complaint   Patient presents with    Follow Up     U/S  2024   Post menopausal bleeding   EMB today        Initial /86 (BP Location: Left arm, Cuff Size: Adult Regular)   Wt 60.4 kg (133 lb 1.6 oz)   LMP 2020   BMI 24.34 kg/m   Estimated body mass index is 24.34 kg/m  as calculated from the following:    Height as of 23: 1.575 m (5' 2\").    Weight as of this encounter: 60.4 kg (133 lb 1.6 oz).  BP completed using cuff size: regular    Questioned patient about current smoking habits.  Pt. has never smoked.          The following HM Due: NONE      Prudence Benitez, RADHA on 2024 at 11:24 AM       "

## 2024-02-06 DIAGNOSIS — E66.3 OVERWEIGHT (BMI 25.0-29.9): ICD-10-CM

## 2024-02-07 ENCOUNTER — TELEPHONE (OUTPATIENT)
Dept: OBGYN | Facility: CLINIC | Age: 54
End: 2024-02-07
Payer: OTHER GOVERNMENT

## 2024-02-07 DIAGNOSIS — N84.0 ENDOMETRIAL POLYP: ICD-10-CM

## 2024-02-07 DIAGNOSIS — N95.0 PMB (POSTMENOPAUSAL BLEEDING): Primary | ICD-10-CM

## 2024-02-07 LAB
PATH REPORT.COMMENTS IMP SPEC: NORMAL
PATH REPORT.COMMENTS IMP SPEC: NORMAL
PATH REPORT.FINAL DX SPEC: NORMAL
PATH REPORT.GROSS SPEC: NORMAL
PATH REPORT.MICROSCOPIC SPEC OTHER STN: NORMAL
PATH REPORT.RELEVANT HX SPEC: NORMAL
PHOTO IMAGE: NORMAL

## 2024-02-07 PROCEDURE — 99207 PR NON-BILLABLE SERV PER CHARTING: CPT | Performed by: STUDENT IN AN ORGANIZED HEALTH CARE EDUCATION/TRAINING PROGRAM

## 2024-02-07 RX ORDER — PHENTERMINE HYDROCHLORIDE 37.5 MG/1
37.5 CAPSULE ORAL EVERY MORNING
Qty: 30 CAPSULE | Refills: 5 | Status: SHIPPED | OUTPATIENT
Start: 2024-02-07

## 2024-02-07 NOTE — TELEPHONE ENCOUNTER
Telephone Call  02/07/2024    Called Dalia Fried to discuss results of endometrial biopsy, below.    Final Diagnosis   Endometrium, biopsy-  Nonphasic endometrial mucosa with endometrial polyp, no evidence of malignancy     - Reviewed findings of endometrial polyp.   - Recommend hysteroscopy dilation and curettage with Myosure and polypectomy  - Discussed r/b/a of procedure and patient agrees to proceed. Agrees to blood transfusion if indicated.   - Case request placed  - Will need pre-op H&P  - 2 week post-op visit  - Going to [place] beginning of April for a month    Tarun Childs MD

## 2024-02-08 ENCOUNTER — TELEPHONE (OUTPATIENT)
Dept: OBGYN | Facility: CLINIC | Age: 54
End: 2024-02-08
Payer: OTHER GOVERNMENT

## 2024-02-08 NOTE — TELEPHONE ENCOUNTER
Patient Name: Dalia Fried   MRN: [id number]   Case#: [id number]   Surgeon(s) and Role:      * Tarun Childs MD - Primary   Date requested: * No dates entered *   Location: [facility] OR   Procedure(s):   HYSTEROSCOPY, WITH DILATION AND CURETTAGE OF UTERUS and polypectomy with myosure

## 2024-02-09 NOTE — TELEPHONE ENCOUNTER
Type of surgery: Hysteroscopy, D&C and Polypectomy w/ Myosure  Location of surgery: [facility]  Date and time of surgery: 2/20/24 @ 730 am  Surgeon: Dr. Childs  Pre-Op Appt Date: will schedule w/ primary (per pt)  Post-Op Appt Date: 03/15/24 @ 1:00 pm   Packet sent out: Yes  Pre-cert/Authorization completed:  Not Applicable  Date: 02/09/24

## 2024-02-10 SDOH — HEALTH STABILITY: PHYSICAL HEALTH: ON AVERAGE, HOW MANY DAYS PER WEEK DO YOU ENGAGE IN MODERATE TO STRENUOUS EXERCISE (LIKE A BRISK WALK)?: 3 DAYS

## 2024-02-10 SDOH — HEALTH STABILITY: PHYSICAL HEALTH: ON AVERAGE, HOW MANY MINUTES DO YOU ENGAGE IN EXERCISE AT THIS LEVEL?: 30 MIN

## 2024-02-10 ASSESSMENT — LIFESTYLE VARIABLES
HOW MANY STANDARD DRINKS CONTAINING ALCOHOL DO YOU HAVE ON A TYPICAL DAY: PATIENT DOES NOT DRINK
HOW OFTEN DO YOU HAVE A DRINK CONTAINING ALCOHOL: NEVER
HOW OFTEN DO YOU HAVE SIX OR MORE DRINKS ON ONE OCCASION: NEVER
AUDIT-C TOTAL SCORE: 0
SKIP TO QUESTIONS 9-10: 1

## 2024-02-10 ASSESSMENT — SOCIAL DETERMINANTS OF HEALTH (SDOH)
HOW OFTEN DO YOU ATTENT MEETINGS OF THE CLUB OR ORGANIZATION YOU BELONG TO?: PATIENT DECLINED
HOW OFTEN DO YOU ATTEND CHURCH OR RELIGIOUS SERVICES?: PATIENT DECLINED
HOW OFTEN DO YOU GET TOGETHER WITH FRIENDS OR RELATIVES?: PATIENT DECLINED
DO YOU BELONG TO ANY CLUBS OR ORGANIZATIONS SUCH AS CHURCH GROUPS UNIONS, FRATERNAL OR ATHLETIC GROUPS, OR SCHOOL GROUPS?: NO
IN A TYPICAL WEEK, HOW MANY TIMES DO YOU TALK ON THE PHONE WITH FAMILY, FRIENDS, OR NEIGHBORS?: TWICE A WEEK

## 2024-02-13 ENCOUNTER — APPOINTMENT (OUTPATIENT)
Dept: LAB | Facility: CLINIC | Age: 54
End: 2024-02-13
Payer: OTHER GOVERNMENT

## 2024-02-13 RX ORDER — HYDROXYZINE PAMOATE 25 MG/1
CAPSULE ORAL
COMMUNITY
Start: 2023-10-02 | End: 2024-02-14

## 2024-02-13 RX ORDER — HYDROCODONE BITARTRATE AND ACETAMINOPHEN 5; 325 MG/1; MG/1
TABLET ORAL
COMMUNITY
Start: 2023-10-12 | End: 2024-02-14

## 2024-02-14 ENCOUNTER — OFFICE VISIT (OUTPATIENT)
Dept: FAMILY MEDICINE | Facility: CLINIC | Age: 54
End: 2024-02-14
Payer: OTHER GOVERNMENT

## 2024-02-14 VITALS
BODY MASS INDEX: 24.11 KG/M2 | OXYGEN SATURATION: 99 % | HEART RATE: 81 BPM | RESPIRATION RATE: 16 BRPM | DIASTOLIC BLOOD PRESSURE: 84 MMHG | TEMPERATURE: 98.6 F | HEIGHT: 62 IN | WEIGHT: 131 LBS | SYSTOLIC BLOOD PRESSURE: 110 MMHG

## 2024-02-14 DIAGNOSIS — Z01.818 PRE-OP EXAM: Primary | ICD-10-CM

## 2024-02-14 DIAGNOSIS — N85.9 ENDOMETRIAL DISORDER: ICD-10-CM

## 2024-02-14 PROCEDURE — 99214 OFFICE O/P EST MOD 30 MIN: CPT | Performed by: NURSE PRACTITIONER

## 2024-02-14 ASSESSMENT — PAIN SCALES - GENERAL: PAINLEVEL: NO PAIN (0)

## 2024-02-14 NOTE — PROGRESS NOTES
Preoperative Evaluation  [facility]  [address] [facility] [address]  Phone: [phone]  Primary Provider: Karis Mclain  Pre-op Performing Provider: KARIS MCLAIN  Feb 14, 2024       Dalia is a 53 year old, presenting for the following:  Pre-Op Exam (DOS: 02/20/2024, Dr Childs, [facility], Hysteroscopy with Polypectomy.), Mouth/Lip Problem (Mouth Dryness. ), and Leg Pain (Concerned with leg cramps. )        2/14/2024     2:44 PM   Additional Questions   Roomed by Umm Pbalo CMA   Accompanied by Self     Surgical Information  Surgery/Procedure: Hysteroscopy  Surgery Location: [facility]  Surgeon: Dr Childs  Surgery Date: 02/20/2024  Time of Surgery: TBD  Where patient plans to recover: At home with family  Fax number for surgical facility: Note does not need to be faxed, will be available electronically in Epic.    Assessment & Plan     The proposed surgical procedure is considered INTERMEDIATE risk.    Pre-op exam  Cleared for procedure    Endometrial disorder  Cleared for procedure     - No identified additional risk factors other than previously addressed    Antiplatelet or Anticoagulation Medication Instructions   - Patient is on no antiplatelet or anticoagulation medications.    Additional Medication Instructions  Patient is to take all scheduled medications on the day of surgery    Recommendation  APPROVAL GIVEN to proceed with proposed procedure, without further diagnostic evaluation.      Subjective       HPI related to upcoming procedure: She is here for pre-op for hysteroscopy and she did have a biopsy done that showed no malignancy.         2/10/2024     8:27 AM   Preop Questions   1. Have you ever had a heart attack or stroke? No   2. Have you ever had surgery on your heart or blood vessels, such as a stent placement, a coronary artery bypass, or surgery on an artery in your head, neck, heart, or legs? No   3. Do you have chest pain with  activity? No   4. Do you have a history of  heart failure? No   5. Do you currently have a cold, bronchitis or symptoms of other infection? No   6. Do you have a cough, shortness of breath, or wheezing? No   7. Do you or anyone in your family have previous history of blood clots? No   8. Do you or does anyone in your family have a serious bleeding problem such as prolonged bleeding following surgeries or cuts? No   9. Have you ever had problems with anemia or been told to take iron pills? No   10. Have you had any abnormal blood loss such as black, tarry or bloody stools, or abnormal vaginal bleeding? UNKNOWN -    11. Have you ever had a blood transfusion? No   12. Are you willing to have a blood transfusion if it is medically needed before, during, or after your surgery? Yes   13. Have you or any of your relatives ever had problems with anesthesia? No   14. Do you have sleep apnea, excessive snoring or daytime drowsiness? No   15. Do you have any artifical heart valves or other implanted medical devices like a pacemaker, defibrillator, or continuous glucose monitor? No   16. Do you have artificial joints? No   17. Are you allergic to latex? No   18. Is there any chance that you may be pregnant? No       Health Care Directive  Patient does not have a Health Care Directive or Living Will: Discussed advance care planning with patient; however, patient declined at this time.    Preoperative Review of    reviewed - no record of controlled substances prescribed.      Patient Active Problem List    Diagnosis Date Noted    Cervical high risk HPV (human papillomavirus) test positive 08/07/2023     Priority: Medium     8/7/23 NIL pap, +HR HPV, not 16/18. Plan 1 yr co-test /notified      Benign paroxysmal positional vertigo 06/21/2023     Priority: Medium     DAILY BOTHERSOME SX FOR TWO MONTHS, SEEN PREVIOUSLY IN BOTH ER AND CLINIC.  NOT IMPROVING WITH TIME, MEDICATIONS, HOME BPPV EXERCISES. TWO YOUNG CHILDREN AT HOME,  SPOUSE DEPLOYED, CONCERNED ABOUT SX WHILE DRIVING. WILL DO ENT CONSULT, CONTINUE EXERCISES, MEDS. CAUTIONED ABOUT NOT DRIVING WHEN SX. PT GIVEN H/O ON BPPV.      Epidemic vertigo 06/21/2023     Priority: Medium     After thorough interview and physical examination I believe she meets criteria for vestibular neuronitis.  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  I think her hearing loss in the changes in her balance two months ago are unrelated.  I've advised her stop her meclizine and have given her vestibular exercises to perform at home.  Recommend follow up with me in two months sooner as needed.  All questions answered.      Head and neck malignancy (H) 06/21/2023     Priority: Medium    Toe pain, left 04/01/2023     Priority: Medium      Past Medical History:   Diagnosis Date    Head and neck malignancy (H) 06/21/2023    Hearing loss      Past Surgical History:   Procedure Laterality Date    COLONOSCOPY N/A 11/17/2023    Procedure: Colonoscopy;  Surgeon: Bronson Jacobson MD;  Location:  GI    COSMETIC SURGERY  2002    Blast    REPAIR HAMMER TOE       Current Outpatient Medications   Medication Sig Dispense Refill    aspirin-acetaminophen-caffeine (EXCEDRIN MIGRAINE) 250-250-65 MG tablet Take 1 tablet by mouth daily as needed for headaches      clobetasol (TEMOVATE) 0.05 % external ointment Use 1/2 fingertip unit (from the tip of your index finger to the first crease) in a thin layer applied to the vulva nightly for 4 weeks, every other night for 4 weeks, and then twice weekly 30 g 1    ibuprofen (ADVIL/MOTRIN) 200 MG tablet Take 200 mg by mouth every 4 hours as needed for pain      metroNIDAZOLE (FLAGYL) 500 MG tablet Take 1 tablet (500 mg) by mouth 2 times daily for 7 days 14 tablet 0    ondansetron (ZOFRAN ODT) 4 MG ODT tab Take 1 tablet (4 mg) by mouth every 8 hours as needed for nausea 30  "tablet 3    phentermine (ADIPEX-P) 37.5 MG capsule Take 1 capsule (37.5 mg) by mouth every morning 30 capsule 5    estradiol (ESTRACE) 0.1 MG/GM vaginal cream Apply 4 g every night x 2 weeks then change to 4 g at bedtime 3 times a week (Patient not taking: Reported on 2/5/2024) 42.5 g 4       Allergies   Allergen Reactions    No Known Allergies         Social History     Tobacco Use    Smoking status: Never    Smokeless tobacco: Never   Substance Use Topics    Alcohol use: Not Currently     Comment: occ     Family History   Problem Relation Age of Onset    Diabetes Father     Thyroid Disease Father     Colon Cancer No family hx of      History   Drug Use Unknown         Review of Systems    Review of Systems  Constitutional, HEENT, cardiovascular, pulmonary, gi and gu systems are negative, except as otherwise noted.  Objective    /84 (BP Location: Right arm, Patient Position: Sitting, Cuff Size: Adult Regular)   Pulse 81   Temp 98.6  F (37  C) (Oral)   Resp 16   Ht 1.575 m (5' 2\")   Wt 59.4 kg (131 lb)   LMP 12/01/2020   SpO2 99%   BMI 23.96 kg/m     Estimated body mass index is 23.96 kg/m  as calculated from the following:    Height as of this encounter: 1.575 m (5' 2\").    Weight as of this encounter: 59.4 kg (131 lb).  Physical Exam  GENERAL: alert and no distress  EYES: Eyes grossly normal to inspection, PERRL and conjunctivae and sclerae normal  HENT: ear canals and TM's normal, nose and mouth without ulcers or lesions  NECK: no adenopathy, no asymmetry, masses, or scars  RESP: lungs clear to auscultation - no rales, rhonchi or wheezes  CV: regular rate and rhythm, normal S1 S2, no S3 or S4, no murmur, click or rub, no peripheral edema  ABDOMEN: soft, nontender, no hepatosplenomegaly, no masses and bowel sounds normal  MS: no gross musculoskeletal defects noted, no edema    Recent Labs   Lab Test 10/03/23  1123   HGB 13.9         POTASSIUM 3.7   CR 0.81        Diagnostics  No labs " were ordered during this visit.   No EKG required for low risk surgery (cataract, skin procedure, breast biopsy, etc).    Revised Cardiac Risk Index (RCRI)  The patient has the following serious cardiovascular risks for perioperative complications:   - No serious cardiac risks = 0 points     RCRI Interpretation: 0 points: Class I (very low risk - 0.4% complication rate)         Signed Electronically by: Isabel Lacey NP  Copy of this evaluation report is provided to requesting physician.

## 2024-02-20 ENCOUNTER — ANESTHESIA EVENT (OUTPATIENT)
Dept: SURGERY | Facility: CLINIC | Age: 54
End: 2024-02-20
Payer: OTHER GOVERNMENT

## 2024-02-20 ENCOUNTER — HOSPITAL ENCOUNTER (OUTPATIENT)
Facility: CLINIC | Age: 54
Discharge: HOME | End: 2024-02-20
Attending: STUDENT IN AN ORGANIZED HEALTH CARE EDUCATION/TRAINING PROGRAM | Admitting: STUDENT IN AN ORGANIZED HEALTH CARE EDUCATION/TRAINING PROGRAM
Payer: OTHER GOVERNMENT

## 2024-02-20 ENCOUNTER — ANESTHESIA (OUTPATIENT)
Dept: SURGERY | Facility: CLINIC | Age: 54
End: 2024-02-20
Payer: OTHER GOVERNMENT

## 2024-02-20 VITALS
HEART RATE: 68 BPM | OXYGEN SATURATION: 99 % | HEIGHT: 62 IN | WEIGHT: 130.6 LBS | SYSTOLIC BLOOD PRESSURE: 130 MMHG | RESPIRATION RATE: 16 BRPM | BODY MASS INDEX: 24.03 KG/M2 | TEMPERATURE: 97 F | DIASTOLIC BLOOD PRESSURE: 89 MMHG

## 2024-02-20 LAB
ABO/RH(D): NORMAL
ANTIBODY SCREEN: NEGATIVE
HGB BLD-MCNC: 14.4 G/DL (ref 11.7–15.7)
SPECIMEN EXPIRATION DATE: NORMAL

## 2024-02-20 PROCEDURE — 86900 BLOOD TYPING SEROLOGIC ABO: CPT | Performed by: STUDENT IN AN ORGANIZED HEALTH CARE EDUCATION/TRAINING PROGRAM

## 2024-02-20 PROCEDURE — 250N000011 HC RX IP 250 OP 636

## 2024-02-20 PROCEDURE — 999N000141 HC STATISTIC PRE-PROCEDURE NURSING ASSESSMENT: Performed by: STUDENT IN AN ORGANIZED HEALTH CARE EDUCATION/TRAINING PROGRAM

## 2024-02-20 PROCEDURE — 88305 TISSUE EXAM BY PATHOLOGIST: CPT | Mod: TC | Performed by: STUDENT IN AN ORGANIZED HEALTH CARE EDUCATION/TRAINING PROGRAM

## 2024-02-20 PROCEDURE — 58558 HYSTEROSCOPY BIOPSY: CPT | Performed by: STUDENT IN AN ORGANIZED HEALTH CARE EDUCATION/TRAINING PROGRAM

## 2024-02-20 PROCEDURE — 272N000001 HC OR GENERAL SUPPLY STERILE: Performed by: STUDENT IN AN ORGANIZED HEALTH CARE EDUCATION/TRAINING PROGRAM

## 2024-02-20 PROCEDURE — 250N000025 HC SEVOFLURANE, PER MIN: Performed by: STUDENT IN AN ORGANIZED HEALTH CARE EDUCATION/TRAINING PROGRAM

## 2024-02-20 PROCEDURE — 710N000009 HC RECOVERY PHASE 1, LEVEL 1, PER MIN: Performed by: STUDENT IN AN ORGANIZED HEALTH CARE EDUCATION/TRAINING PROGRAM

## 2024-02-20 PROCEDURE — 250N000011 HC RX IP 250 OP 636: Performed by: ANESTHESIOLOGY

## 2024-02-20 PROCEDURE — 250N000009 HC RX 250: Performed by: ANESTHESIOLOGY

## 2024-02-20 PROCEDURE — 250N000013 HC RX MED GY IP 250 OP 250 PS 637: Performed by: STUDENT IN AN ORGANIZED HEALTH CARE EDUCATION/TRAINING PROGRAM

## 2024-02-20 PROCEDURE — 258N000003 HC RX IP 258 OP 636: Performed by: ANESTHESIOLOGY

## 2024-02-20 PROCEDURE — 85018 HEMOGLOBIN: CPT | Performed by: STUDENT IN AN ORGANIZED HEALTH CARE EDUCATION/TRAINING PROGRAM

## 2024-02-20 PROCEDURE — 88305 TISSUE EXAM BY PATHOLOGIST: CPT | Mod: 26 | Performed by: PATHOLOGY

## 2024-02-20 PROCEDURE — 360N000076 HC SURGERY LEVEL 3, PER MIN: Performed by: STUDENT IN AN ORGANIZED HEALTH CARE EDUCATION/TRAINING PROGRAM

## 2024-02-20 PROCEDURE — 36415 COLL VENOUS BLD VENIPUNCTURE: CPT | Performed by: STUDENT IN AN ORGANIZED HEALTH CARE EDUCATION/TRAINING PROGRAM

## 2024-02-20 PROCEDURE — 250N000009 HC RX 250: Performed by: STUDENT IN AN ORGANIZED HEALTH CARE EDUCATION/TRAINING PROGRAM

## 2024-02-20 PROCEDURE — 370N000017 HC ANESTHESIA TECHNICAL FEE, PER MIN: Performed by: STUDENT IN AN ORGANIZED HEALTH CARE EDUCATION/TRAINING PROGRAM

## 2024-02-20 PROCEDURE — 710N000012 HC RECOVERY PHASE 2, PER MINUTE: Performed by: STUDENT IN AN ORGANIZED HEALTH CARE EDUCATION/TRAINING PROGRAM

## 2024-02-20 RX ORDER — HYDROMORPHONE HYDROCHLORIDE 1 MG/ML
0.5 INJECTION, SOLUTION INTRAMUSCULAR; INTRAVENOUS; SUBCUTANEOUS
Status: DISCONTINUED | OUTPATIENT
Start: 2024-02-20 | End: 2024-02-20

## 2024-02-20 RX ORDER — ACETAMINOPHEN 325 MG/1
975 TABLET ORAL ONCE
Status: DISCONTINUED | OUTPATIENT
Start: 2024-02-20 | End: 2024-02-20 | Stop reason: HOSPADM

## 2024-02-20 RX ORDER — KETOROLAC TROMETHAMINE 15 MG/ML
15 INJECTION, SOLUTION INTRAMUSCULAR; INTRAVENOUS
Status: DISCONTINUED | OUTPATIENT
Start: 2024-02-20 | End: 2024-02-20 | Stop reason: HOSPADM

## 2024-02-20 RX ORDER — SODIUM CHLORIDE, SODIUM LACTATE, POTASSIUM CHLORIDE, CALCIUM CHLORIDE 600; 310; 30; 20 MG/100ML; MG/100ML; MG/100ML; MG/100ML
INJECTION, SOLUTION INTRAVENOUS CONTINUOUS
Status: DISCONTINUED | OUTPATIENT
Start: 2024-02-20 | End: 2024-02-20 | Stop reason: HOSPADM

## 2024-02-20 RX ORDER — METHADONE HYDROCHLORIDE 10 MG/ML
2 INJECTION, SOLUTION INTRAMUSCULAR; INTRAVENOUS; SUBCUTANEOUS 3 TIMES DAILY PRN
Status: DISCONTINUED | OUTPATIENT
Start: 2024-02-20 | End: 2024-02-20 | Stop reason: HOSPADM

## 2024-02-20 RX ORDER — ACETAMINOPHEN 325 MG/1
975 TABLET ORAL ONCE
Status: COMPLETED | OUTPATIENT
Start: 2024-02-20 | End: 2024-02-20

## 2024-02-20 RX ORDER — ONDANSETRON 2 MG/ML
4 INJECTION INTRAMUSCULAR; INTRAVENOUS EVERY 30 MIN PRN
Status: DISCONTINUED | OUTPATIENT
Start: 2024-02-20 | End: 2024-02-20 | Stop reason: HOSPADM

## 2024-02-20 RX ORDER — IBUPROFEN 800 MG/1
800 TABLET, FILM COATED ORAL ONCE
Status: DISCONTINUED | OUTPATIENT
Start: 2024-02-20 | End: 2024-02-20 | Stop reason: HOSPADM

## 2024-02-20 RX ORDER — ONDANSETRON 2 MG/ML
INJECTION INTRAMUSCULAR; INTRAVENOUS PRN
Status: DISCONTINUED | OUTPATIENT
Start: 2024-02-20 | End: 2024-02-20

## 2024-02-20 RX ORDER — ACETAMINOPHEN 325 MG/1
975 TABLET ORAL ONCE
Status: DISCONTINUED | OUTPATIENT
Start: 2024-02-20 | End: 2024-02-20

## 2024-02-20 RX ORDER — PROPOFOL 10 MG/ML
INJECTION, EMULSION INTRAVENOUS PRN
Status: DISCONTINUED | OUTPATIENT
Start: 2024-02-20 | End: 2024-02-20

## 2024-02-20 RX ORDER — KETOROLAC TROMETHAMINE 30 MG/ML
INJECTION, SOLUTION INTRAMUSCULAR; INTRAVENOUS PRN
Status: DISCONTINUED | OUTPATIENT
Start: 2024-02-20 | End: 2024-02-20

## 2024-02-20 RX ORDER — LIDOCAINE HYDROCHLORIDE 10 MG/ML
INJECTION, SOLUTION EPIDURAL; INFILTRATION; INTRACAUDAL; PERINEURAL PRN
Status: DISCONTINUED | OUTPATIENT
Start: 2024-02-20 | End: 2024-02-20 | Stop reason: HOSPADM

## 2024-02-20 RX ORDER — ONDANSETRON 4 MG/1
4 TABLET, ORALLY DISINTEGRATING ORAL EVERY 30 MIN PRN
Status: DISCONTINUED | OUTPATIENT
Start: 2024-02-20 | End: 2024-02-20 | Stop reason: HOSPADM

## 2024-02-20 RX ORDER — LABETALOL HYDROCHLORIDE 5 MG/ML
10 INJECTION, SOLUTION INTRAVENOUS
Status: DISCONTINUED | OUTPATIENT
Start: 2024-02-20 | End: 2024-02-20 | Stop reason: HOSPADM

## 2024-02-20 RX ORDER — MAGNESIUM SULFATE HEPTAHYDRATE 40 MG/ML
2 INJECTION, SOLUTION INTRAVENOUS
Status: DISCONTINUED | OUTPATIENT
Start: 2024-02-20 | End: 2024-02-20 | Stop reason: HOSPADM

## 2024-02-20 RX ORDER — LIDOCAINE 40 MG/G
CREAM TOPICAL
Status: DISCONTINUED | OUTPATIENT
Start: 2024-02-20 | End: 2024-02-20 | Stop reason: HOSPADM

## 2024-02-20 RX ORDER — OXYCODONE HYDROCHLORIDE 5 MG/1
5 TABLET ORAL
Status: DISCONTINUED | OUTPATIENT
Start: 2024-02-20 | End: 2024-02-20 | Stop reason: HOSPADM

## 2024-02-20 RX ORDER — LIDOCAINE HYDROCHLORIDE 20 MG/ML
INJECTION, SOLUTION INFILTRATION; PERINEURAL PRN
Status: DISCONTINUED | OUTPATIENT
Start: 2024-02-20 | End: 2024-02-20

## 2024-02-20 RX ORDER — METHADONE HYDROCHLORIDE 10 MG/ML
INJECTION, SOLUTION INTRAMUSCULAR; INTRAVENOUS; SUBCUTANEOUS PRN
Status: DISCONTINUED | OUTPATIENT
Start: 2024-02-20 | End: 2024-02-20

## 2024-02-20 RX ORDER — DEXAMETHASONE SODIUM PHOSPHATE 4 MG/ML
INJECTION, SOLUTION INTRA-ARTICULAR; INTRALESIONAL; INTRAMUSCULAR; INTRAVENOUS; SOFT TISSUE PRN
Status: DISCONTINUED | OUTPATIENT
Start: 2024-02-20 | End: 2024-02-20

## 2024-02-20 RX ORDER — HYDRALAZINE HYDROCHLORIDE 20 MG/ML
10 INJECTION INTRAMUSCULAR; INTRAVENOUS EVERY 10 MIN PRN
Status: DISCONTINUED | OUTPATIENT
Start: 2024-02-20 | End: 2024-02-20 | Stop reason: HOSPADM

## 2024-02-20 RX ORDER — ALBUTEROL SULFATE 0.83 MG/ML
2.5 SOLUTION RESPIRATORY (INHALATION) EVERY 4 HOURS PRN
Status: DISCONTINUED | OUTPATIENT
Start: 2024-02-20 | End: 2024-02-20 | Stop reason: HOSPADM

## 2024-02-20 RX ADMIN — ACETAMINOPHEN 975 MG: 325 TABLET, FILM COATED ORAL at 06:20

## 2024-02-20 RX ADMIN — SODIUM CHLORIDE, POTASSIUM CHLORIDE, SODIUM LACTATE AND CALCIUM CHLORIDE: 600; 310; 30; 20 INJECTION, SOLUTION INTRAVENOUS at 07:40

## 2024-02-20 RX ADMIN — Medication 80 MG: at 07:44

## 2024-02-20 RX ADMIN — MIDAZOLAM 2 MG: 1 INJECTION INTRAMUSCULAR; INTRAVENOUS at 07:33

## 2024-02-20 RX ADMIN — METHADONE HYDROCHLORIDE 10 MG: 10 INJECTION, SOLUTION INTRAMUSCULAR; INTRAVENOUS; SUBCUTANEOUS at 07:44

## 2024-02-20 RX ADMIN — DEXMEDETOMIDINE HYDROCHLORIDE 0.5 MCG/KG/HR: 100 INJECTION, SOLUTION INTRAVENOUS at 07:44

## 2024-02-20 RX ADMIN — PROPOFOL 100 MG: 10 INJECTION, EMULSION INTRAVENOUS at 07:44

## 2024-02-20 RX ADMIN — SODIUM CHLORIDE, POTASSIUM CHLORIDE, SODIUM LACTATE AND CALCIUM CHLORIDE: 600; 310; 30; 20 INJECTION, SOLUTION INTRAVENOUS at 06:27

## 2024-02-20 RX ADMIN — KETOROLAC TROMETHAMINE 15 MG: 30 INJECTION, SOLUTION INTRAMUSCULAR at 07:44

## 2024-02-20 RX ADMIN — SODIUM CHLORIDE, POTASSIUM CHLORIDE, SODIUM LACTATE AND CALCIUM CHLORIDE: 600; 310; 30; 20 INJECTION, SOLUTION INTRAVENOUS at 08:18

## 2024-02-20 RX ADMIN — ONDANSETRON 4 MG: 2 INJECTION INTRAMUSCULAR; INTRAVENOUS at 07:44

## 2024-02-20 RX ADMIN — FAMOTIDINE 20 MG: 10 INJECTION, SOLUTION INTRAVENOUS at 07:51

## 2024-02-20 RX ADMIN — ONDANSETRON 4 MG: 2 INJECTION INTRAMUSCULAR; INTRAVENOUS at 09:47

## 2024-02-20 RX ADMIN — DEXAMETHASONE SODIUM PHOSPHATE 8 MG: 4 INJECTION, SOLUTION INTRA-ARTICULAR; INTRALESIONAL; INTRAMUSCULAR; INTRAVENOUS; SOFT TISSUE at 07:44

## 2024-02-20 RX ADMIN — PHENYLEPHRINE HYDROCHLORIDE 100 MCG: 10 INJECTION INTRAVENOUS at 08:09

## 2024-02-20 RX ADMIN — LIDOCAINE HYDROCHLORIDE 50 MG: 20 INJECTION, SOLUTION INFILTRATION; PERINEURAL at 07:44

## 2024-02-20 ASSESSMENT — ACTIVITIES OF DAILY LIVING (ADL)
ADLS_ACUITY_SCORE: 30
ADLS_ACUITY_SCORE: 30

## 2024-02-20 NOTE — ANESTHESIA POSTPROCEDURE EVALUATION
Patient: Dalia Fried    Procedure: Procedure(s):  HYSTEROSCOPY, WITH DILATION AND CURETTAGE OF UTERUS and polypectomy with myosure       Anesthesia Type:  General    Note:  Disposition: Outpatient   Postop Pain Control: Uneventful            Sign Out: Well controlled pain   PONV: No   Neuro/Psych: Uneventful            Sign Out: Acceptable/Baseline neuro status   Airway/Respiratory: Uneventful            Sign Out: Acceptable/Baseline resp. status   CV/Hemodynamics: Uneventful            Sign Out: Acceptable CV status   Other NRE: NONE   DID A NON-ROUTINE EVENT OCCUR? No           Last vitals:  Vitals Value Taken Time   /96 02/20/24 0900   Temp 97.8  F (36.6  C) 02/20/24 0910   Pulse 75 02/20/24 0921   Resp 16 02/20/24 0921   SpO2 100 % 02/20/24 0924   Vitals shown include unfiled device data.    Electronically Signed By: Gil Fried MD  February 20, 2024  4:21 PM

## 2024-02-20 NOTE — ANESTHESIA PROCEDURE NOTES
Airway       Patient location during procedure: OR       Procedure Start/Stop Times: 2/20/2024 7:45 AM  Staff -        Anesthesiologist:  Gil Fried MD       CRNA: Joanne Daily APRN CRNA       Performed By: CRNA  Consent for Airway        Urgency: elective  Indications and Patient Condition       Indications for airway management: tanisha-procedural       Induction type:RSI       Mask difficulty assessment: 0 - not attempted    Final Airway Details       Final airway type: endotracheal airway       Successful airway: ETT - single  Endotracheal Airway Details        ETT size (mm): 7.0       Cuffed: yes       Cuff volume (mL): 7       Successful intubation technique: direct laryngoscopy       DL Blade Type: MAC 4       Grade View of Cords: 1       Adjucts: stylet       Position: Right       Measured from: gums/teeth       Secured at (cm): 23       Bite block used: None    Post intubation assessment        Placement verified by: capnometry, equal breath sounds and chest rise        Number of attempts at approach: 1       Number of other approaches attempted: 0       Secured with: tape       Ease of procedure: easy       Dentition: Intact and Unchanged    Medication(s) Administered   Medication Administration Time: 2/20/2024 7:45 AM

## 2024-02-20 NOTE — DISCHARGE INSTRUCTIONS
Maximum acetaminophen (Tylenol) dose from all sources should not exceed 4 grams (4000 mg) per day.  You last had 975 mg at 6:20am; do not take Tylenol products again until after 12:20pm if needed.      DR. DEANGELO JUDEG  [address] [address]  Phone: [phone]

## 2024-02-20 NOTE — ANESTHESIA PREPROCEDURE EVALUATION
Anesthesia Pre-Procedure Evaluation    Patient: Dalia Fried   MRN: [id number] : 1970        Procedure : Procedure(s):  HYSTEROSCOPY, WITH DILATION AND CURETTAGE OF UTERUS and polypectomy with myosure          Past Medical History:   Diagnosis Date    Head and neck malignancy (H) 2023    Hearing loss       Past Surgical History:   Procedure Laterality Date    COLONOSCOPY N/A 2023    Procedure: Colonoscopy;  Surgeon: Bronson Jacobson MD;  Location:  GI    COSMETIC SURGERY      Blast    REPAIR HAMMER TOE        Allergies   Allergen Reactions    No Known Allergies       Social History     Tobacco Use    Smoking status: Never    Smokeless tobacco: Never   Substance Use Topics    Alcohol use: Not Currently     Comment: occ      Wt Readings from Last 1 Encounters:   24 59.2 kg (130 lb 9.6 oz)        Anesthesia Evaluation   Pt has had prior anesthetic.     No history of anesthetic complications       ROS/MED HX  ENT/Pulmonary:  - neg pulmonary ROS     Neurologic:  - neg neurologic ROS     Cardiovascular:  - neg cardiovascular ROS     METS/Exercise Tolerance:     Hematologic:  - neg hematologic  ROS     Musculoskeletal:  - neg musculoskeletal ROS     GI/Hepatic:  - neg GI/hepatic ROS     Renal/Genitourinary:  - neg Renal ROS     Endo:  - neg endo ROS     Psychiatric/Substance Use:  - neg psychiatric ROS     Infectious Disease:  - neg infectious disease ROS     Malignancy: Comment: H/o cancer  (+) Malignancy,     Other:            Physical Exam    Airway        Mallampati: II   TM distance: > 3 FB   Neck ROM: full   Mouth opening: > 3 cm    Respiratory Devices and Support         Dental       (+) Minor Abnormalities - some fillings, tiny chips      Cardiovascular          Rhythm and rate: regular and normal     Pulmonary           breath sounds clear to auscultation           OUTSIDE LABS:  CBC:   Lab Results   Component Value Date    WBC 4.9 10/03/2023    HGB 14.4 2024    HGB  "13.9 10/03/2023    HCT 41.2 10/03/2023     10/03/2023     BMP:   Lab Results   Component Value Date     10/03/2023    POTASSIUM 3.7 10/03/2023    CHLORIDE 103 10/03/2023    CO2 21 (L) 10/03/2023    BUN 12.9 10/03/2023    CR 0.81 10/03/2023    GLC 92 10/03/2023     COAGS: No results found for: \"PTT\", \"INR\", \"FIBR\"  POC: No results found for: \"BGM\", \"HCG\", \"HCGS\"  HEPATIC:   Lab Results   Component Value Date    ALBUMIN 4.3 10/03/2023    PROTTOTAL 7.1 10/03/2023    ALT 21 10/03/2023    AST 23 10/03/2023    ALKPHOS 90 10/03/2023    BILITOTAL 0.2 10/03/2023     OTHER:   Lab Results   Component Value Date    SARAH 9.1 10/03/2023    LIPASE 27 10/03/2023       Anesthesia Plan    ASA Status:  2    NPO Status:  NPO Appropriate    Anesthesia Type: General.     - Airway: LMA   Induction: Intravenous.   Maintenance: Balanced.   Techniques and Equipment:       - Drips/Meds: Dexmed. infusion     Consents    Anesthesia Plan(s) and associated risks, benefits, and realistic alternatives discussed. Questions answered and patient/representative(s) expressed understanding.     - Discussed:     - Discussed with:  Patient      - Extended Intubation/Ventilatory Support Discussed: No.      - Patient is DNR/DNI Status: No     Use of blood products discussed: No .     Postoperative Care    Pain management: IV analgesics, Oral pain medications, Multi-modal analgesia.     - Plan for long acting post-op opioid use   PONV prophylaxis: Dexamethasone or Solumedrol, Ondansetron (or other 5HT-3)     Comments:    Other Comments:   Methadone 10 mg  Decadron 8 mg  Zofran 4 mg  Toradol 15 mg  Precedex  Sugammadex if paralytic to be used            Gil Fried MD                    "

## 2024-02-20 NOTE — LETTER
[facility] POST ANESTHESIA CARE  [address]  [facility] [address]  Phone: [phone]  Fax: [phone]    February 20, 2024        Dalia Fried  [address] [facility] [address]          To whom it may concern:    RE: Dalia Fried had an operative procedure performed 02/20/24. She may return to work on Monday 2/26, or sooner if she feels ready.    Please contact me for questions or concerns.      Sincerely,      Tarun Childs,*

## 2024-02-20 NOTE — OP NOTE
[facility]  Gynecology Operative Note     Date of Service: 2024  Surgeon:  Tarun Childs MD    Preop Diagnosis:    - Postmenopausal bleeding  - Endometrial polyp    Postop Diagnosis:    - Same s/p below stated procedure    Procedure:   - Hysteroscopy with dilation and curettage and polypectomy with myosure    Anesthesia:  GETA & Local  EBL:  5 mL  IVF:  1000 mL crystalloid   UOP:  Not drained  Drains:  None  Fluid Deficit: 75 mL normal saline  Complications: None apparent    Specimens:    ID Type Source Tests Collected by Time Destination   1 : endometrial polyp and currettings Tissue Endometrium SURGICAL PATHOLOGY EXAM Tarun Childs MD 2024  8:08 AM      Indications:   Ms. Dalia Fried is a 53 year old , who presented to clinic with postmenopausal bleeding. Endometrial biopsy showed a suspected endometrial polyp. A hysteroscopy with dilation and curettage and polypectomy with myosure was recommended. The risks, benefits, and alternatives were discussed with the patient, and she agreed to proceed. Written informed consent was obtained prior to procedure.     Findings:   Exam under anesthesia revealed normal external female genitalia. Uterus small, mobile, anteverted. No adnexal masses noted on bimanual exam. Normal appearing multiparous cervix. Cervix dilated to 6 mm. Hysteroscopic examination revealed 2 small polyps on posterior right wall of mid-uterus and otherwise thin atrophic endometrium. There was also a possible small polyp in the proximal cervix. Both tubal ostia visualized.    Procedure Details:   The patient was brought to the operating room where adequate GETA was administered. SCDs in place. Preoperative antibiotics were not indicated. Patient was placed in dorsal lithotomy position with feet in yellow fin stirups. Exam under anesthesia revealed findings noted above. The patient was prepped and draped in the usual sterile fashion. The  patient was not straight catheterized. A sterile speculum was placed. The anterior lip of the cervix was injected with 1% Lidocaine plain and then grasped with a single tooth tenaculum. A paracervical block was placed with the same anesthetic injected at the 4 and 8 o'clock positions in the cervicovaginal junction. A total of 20 mL of 1% Lidocaine plain was used. The cervix sequentially dilated to 6 mm with hegar dilators. The cervix was easily dilated. The hysteroscope was placed in cervix with inflow turned on to achieve hydrodilation of cervix and the hysteroscope was advanced into endometrial cavity. The uterine cavity was explored with findings as noted above.  Outflow removed and Myosure light morcellator inserted through hysteroscope. Endometrial polyps were removed using Myosure with good results and without issues. Attempted to do global sampling but mild bleeding was encountered and this was stopped as this tissue did not appear thickened. A small possible polyp was noted in the proximal cervix and this was also removed. The scope and was removed from the uterus. The tenaculum was removed and tenaculum sites were noted to be hemostatic with pressure from a sponge stick and application of silver nitrate. All instruments were removed from the vagina.     The sponge, needle, and instrument counts were correct x 2. The patient tolerated the procedure well and was transferred to recovery in stable condition.     Tarun Childs MD

## 2024-02-20 NOTE — ANESTHESIA CARE TRANSFER NOTE
Patient: Dalia Fried    Procedure: Procedure(s):  HYSTEROSCOPY, WITH DILATION AND CURETTAGE OF UTERUS and polypectomy with myosure       Diagnosis: PMB (postmenopausal bleeding) [N95.0]  Endometrial polyp [N84.0]  Diagnosis Additional Information: No value filed.    Anesthesia Type:   General     Note:    Oropharynx: oropharynx clear of all foreign objects and spontaneously breathing  Level of Consciousness: drowsy  Oxygen Supplementation: face mask  Level of Supplemental Oxygen (L/min / FiO2): 6  Independent Airway: airway patency satisfactory and stable  Dentition: dentition unchanged  Vital Signs Stable: post-procedure vital signs reviewed and stable  Report to RN Given: handoff report given  Patient transferred to: PACU    Handoff Report: Identifed the Patient, Identified the Reponsible Provider, Reviewed the pertinent medical history, Discussed the surgical course, Reviewed Intra-OP anesthesia mangement and issues during anesthesia, Set expectations for post-procedure period and Allowed opportunity for questions and acknowledgement of understanding  Vitals:  Vitals Value Taken Time   /90 02/20/24 0830   Temp 97.6  F (36.4  C) 02/20/24 0829   Pulse 84 02/20/24 0832   Resp 15 02/20/24 0832   SpO2 100 % 02/20/24 0832   Vitals shown include unfiled device data.    Electronically Signed By: OSCAR Downs CRNA  February 20, 2024  8:33 AM

## 2024-02-23 ENCOUNTER — OFFICE VISIT (OUTPATIENT)
Dept: NEUROLOGY | Facility: CLINIC | Age: 54
End: 2024-02-23
Payer: OTHER GOVERNMENT

## 2024-02-23 DIAGNOSIS — G43.709 CHRONIC MIGRAINE W/O AURA W/O STATUS MIGRAINOSUS, NOT INTRACTABLE: Primary | ICD-10-CM

## 2024-02-23 PROCEDURE — 64615 CHEMODENERV MUSC MIGRAINE: CPT | Performed by: PSYCHIATRY & NEUROLOGY

## 2024-02-23 NOTE — LETTER
2/23/2024         RE: Dalia Fried  [address] [place] [facility] [address]        Dear Colleague,    Thank you for referring your patient, Dalia Fried, to the [facility]. Please see a copy of my visit note below.    [facility]  Botulinum Toxin Procedure    Emelia Muniz MD  Headache Neurology    February 23, 2024    Procedure:  OnabotulinumtoxinA injections for chronic migraine  Indication:  Chronic migraine    Dalia Fried suffers from severe intractable headaches. She was referred by Elisa Oneill PA-C for onabotulinumtoxinA injections for headache.       Prior to initiation of botulinum toxin injections, Dalia reported 30/30 headache days per month, with 15+/30 severe headache days per month. Her headaches are quite disabling and often interfere with her ability to function normally.     Her headaches meet criteria for chronic migraine. At baseline, she describes her headaches as starting at the back of her neck moving up towards the back of her head, wrapping to bilateral temples and around her eyes.  It is a pressure-like pain.  Pain can be worse overnight and she often wakes up with a headache.  Headache is present constantly unless she is taking medications to treat headache. She rates her typical headache as a 5-6/10 and severe headaches as an 8-9/10 in intensity. She has associated nausea with rare vomiting, fatigue, heat intolerance, and phonophobia.       This is her second round of botulinum toxin injections today (previous round on 11/28/2023).    Today, she reports 30/30 headache days per month, with 0/30 severe headache days per month.      She has attempted other migraine prophylactic treatments in the past, which have included: topiramate.     Procedural Pause: Procedural pause was conducted to verify correct patient identity, procedure to be performed, correct side and site, correct patient position, and special requirements. Appropriate  hand hygiene was utilized, and each injection site was prepped with alcohol wipe or Chloraprep swab.     Procedure Details: 200 units of onabotulinumtoxinA was diluted in 4 mL 0.9% normal saline. A total of 150 units of onabotulinumtoxinA were injected using 30 gauge 0.5 in needles into the muscles listed below. 50 units of onabotulinumtoxinA were wasted.     Injection Sites: Total = 150 units onabotulinumtoxinA      and Procerus muscles - 5 units into the left and right corrugators and 5 units into the procerus (15 units total)    Frontalis muscles - 5 units into the left superior frontalis and 5 units into the right superior frontalis (2 injection sites per muscle) (10 units total)    Temporalis muscles - 12.5 units into the left temporalis muscle and 12.5 units into the right temporalis muscle (2 injection sites per muscle) (25 units total)    Occipitalis muscles - 12.5 units into the left occipitalis muscle and 12.5 units into the right occipitalis muscle (2 injection sites per muscle) (25 units total)    Splenius Capitis muscles - 12.5 units into the left splenius capitis muscle and 12.5 units into the right splenius capitis muscle (2 injection sites per muscle, divided into 2/3 anteriorly and 1/3 posteriorly) (25 units total)      Trapezius muscles - 25 units into the left trapezius muscle and 25 units into the right trapezius muscle (3 injection sites per muscle, divided 5 units, 10 units, 10 units, medial to lateral) (50 units total)    Ms. Fried tolerated the procedure well without immediate complications.  She will follow up in clinic for assessment of the effectiveness of treatment.  She did not report any change in her pain level after the botulinumtoxinA injection procedure.    Emelia Muniz MD  Headache Neurology  [facility]      Again, thank you for allowing me to participate in the care of your patient.        Sincerely,        Emelia Muniz MD

## 2024-02-23 NOTE — PROGRESS NOTES
[facility]  Botulinum Toxin Procedure    Emelia Muniz MD  Headache Neurology    February 23, 2024    Procedure:  OnabotulinumtoxinA injections for chronic migraine  Indication:  Chronic migraine    Dalia Fried suffers from severe intractable headaches. She was referred by Elisa Oneill PA-C for onabotulinumtoxinA injections for headache.       Prior to initiation of botulinum toxin injections, Dalia reported 30/30 headache days per month, with 15+/30 severe headache days per month. Her headaches are quite disabling and often interfere with her ability to function normally.     Her headaches meet criteria for chronic migraine. At baseline, she describes her headaches as starting at the back of her neck moving up towards the back of her head, wrapping to bilateral temples and around her eyes.  It is a pressure-like pain.  Pain can be worse overnight and she often wakes up with a headache.  Headache is present constantly unless she is taking medications to treat headache. She rates her typical headache as a 5-6/10 and severe headaches as an 8-9/10 in intensity. She has associated nausea with rare vomiting, fatigue, heat intolerance, and phonophobia.       This is her second round of botulinum toxin injections today (previous round on 11/28/2023).    Today, she reports 30/30 headache days per month, with 0/30 severe headache days per month.      She has attempted other migraine prophylactic treatments in the past, which have included: topiramate.     Procedural Pause: Procedural pause was conducted to verify correct patient identity, procedure to be performed, correct side and site, correct patient position, and special requirements. Appropriate hand hygiene was utilized, and each injection site was prepped with alcohol wipe or Chloraprep swab.     Procedure Details: 200 units of onabotulinumtoxinA was diluted in 4 mL 0.9% normal saline. A total of 150 units of onabotulinumtoxinA were injected using 30  gauge 0.5 in needles into the muscles listed below. 50 units of onabotulinumtoxinA were wasted.     Injection Sites: Total = 150 units onabotulinumtoxinA      and Procerus muscles - 5 units into the left and right corrugators and 5 units into the procerus (15 units total)    Frontalis muscles - 5 units into the left superior frontalis and 5 units into the right superior frontalis (2 injection sites per muscle) (10 units total)    Temporalis muscles - 12.5 units into the left temporalis muscle and 12.5 units into the right temporalis muscle (2 injection sites per muscle) (25 units total)    Occipitalis muscles - 12.5 units into the left occipitalis muscle and 12.5 units into the right occipitalis muscle (2 injection sites per muscle) (25 units total)    Splenius Capitis muscles - 12.5 units into the left splenius capitis muscle and 12.5 units into the right splenius capitis muscle (2 injection sites per muscle, divided into 2/3 anteriorly and 1/3 posteriorly) (25 units total)      Trapezius muscles - 25 units into the left trapezius muscle and 25 units into the right trapezius muscle (3 injection sites per muscle, divided 5 units, 10 units, 10 units, medial to lateral) (50 units total)    Ms. Fried tolerated the procedure well without immediate complications.  She will follow up in clinic for assessment of the effectiveness of treatment.  She did not report any change in her pain level after the botulinumtoxinA injection procedure.    Emelia Muniz MD  Headache Neurology  [facility]

## 2024-03-15 ENCOUNTER — OFFICE VISIT (OUTPATIENT)
Dept: OBGYN | Facility: CLINIC | Age: 54
End: 2024-03-15
Payer: OTHER GOVERNMENT

## 2024-03-15 VITALS — DIASTOLIC BLOOD PRESSURE: 84 MMHG | WEIGHT: 130.8 LBS | BODY MASS INDEX: 23.92 KG/M2 | SYSTOLIC BLOOD PRESSURE: 132 MMHG

## 2024-03-15 DIAGNOSIS — Z98.890 STATUS POST HYSTEROSCOPY: Primary | ICD-10-CM

## 2024-03-15 DIAGNOSIS — N95.8 GENITOURINARY SYNDROME OF MENOPAUSE: ICD-10-CM

## 2024-03-15 DIAGNOSIS — L90.0 LICHEN SCLEROSUS: ICD-10-CM

## 2024-03-15 DIAGNOSIS — N84.0 ENDOMETRIAL POLYP: ICD-10-CM

## 2024-03-15 DIAGNOSIS — N95.0 PMB (POSTMENOPAUSAL BLEEDING): ICD-10-CM

## 2024-03-15 DIAGNOSIS — N95.1 VAGINAL DRYNESS, MENOPAUSAL: ICD-10-CM

## 2024-03-15 PROCEDURE — 99214 OFFICE O/P EST MOD 30 MIN: CPT | Performed by: STUDENT IN AN ORGANIZED HEALTH CARE EDUCATION/TRAINING PROGRAM

## 2024-03-15 RX ORDER — CLOBETASOL PROPIONATE 0.5 MG/G
OINTMENT TOPICAL
Qty: 60 G | Refills: 1 | Status: SHIPPED | OUTPATIENT
Start: 2024-03-15

## 2024-03-15 RX ORDER — ESTRADIOL 0.1 MG/G
CREAM VAGINAL
Qty: 42.5 G | Refills: 1 | Status: SHIPPED | OUTPATIENT
Start: 2024-03-15

## 2024-03-15 NOTE — PROGRESS NOTES
[facility]  Postoperative Visit    CC: Postoperative visit and f/up chronic conditions    S:  Dalia Fried is a 53 year old  who presents for a postop visit following a hysteroscopy on 24 for PMB with benign findings as well as f/up for other chronic gyn conditions. She is here alone.    Postop status  PMB/Endometrial Polyp  - Feeling great now  - Had constipation, some pain, little bit of bleeding  - Again reviewed benign findings on pathology    Lichen Sclerosus  - On visit on  Clobetasol was prescribed. She was using it daily but then stopped at the time of hysteroscopy. She restarted and is now using it every other day (at night)  - She is using vaseline on a daily basis  - She is not having any vulvar itching or discomfort  - She has what sounds like normal vaginal discharge without odor  - She continues to have some pain with intercourse and feels as though her vagina is narrow    GSM  Vaginal Dryness  Pain with intercourse  - Recommended starting Replens at  visit. She has not started this yet  - Also recommended that she stop using vaginal estrogen as she was prescribed a very high dose (4g a night) and it was not clear if this was contributing to her bleeding.   - She continues to have some pain with intercourse and feels as though her vagina is narrow    She and  are moving back to [place] on .   Plans to find an OB/GYN there but would like enough medication to last her a few months    O:  /84 (BP Location: Right arm, Cuff Size: Adult Regular)   Wt 59.3 kg (130 lb 12.8 oz)   LMP 2020   BMI 23.92 kg/m      Exam:  GEN: Appears well, NAD  : Atrophic external genitalia. Tissue surrounding vaginal introitus just distal to hymen whitish in color. Tissue surrounding urethra orifice also whitened. Vaginal introitus somewhat narrowed. Posterior fourchette and vaginal orifice from 4 to 7 o'clock somewhat raw appearing            Labs:  Pathology  Report (24):  Final Diagnosis   Endometrial polyp, hysteroscopic dilation and curettage and polypectomy with Myosure:  Endometrium, biopsy:  - Multiple fragments of benign inactive to weakly proliferative endometrium, some polyp-like, and negative for hyperplasia, atypia and malignancy.   - Multiple fragments of benign smooth muscle tissue.     A/P:  Dalia Fried is a 53 year old  who presents for a postop visit following a hysteroscopy on 24 for PMB with benign findings as well as f/up for other chronic gyn conditions.    #Postop Status  - Doing well    #PMB  #Endometrial Polyp  - Pathology findings reviewed. Nothing further to do    #Lichen Sclerosus  - Reviewed lichen sclerosis diagnosis, course, treatment  - Recommend restarting clobetasol nightly x 4 weeks, then every other day x 4 weeks, twice a week x 4 weeks  - Continue to use Vaseline PRN  - Discussed that she should find a ob/gyn in [place] to have this condition followed. She is in agreement  - Reviewed that she may require/benefit from vaginal dilator therapy in the future    #GSM  #Vaginal Dryness  #Pain with [place]  - Recommend restarting vaginal estrogen as path is back and benign and she continues to have symptoms. Rx for estrace 0.5 g nightly for 2 weeks and then twice a week.    - Discussed importance of lubrication with intercourse  - If symptoms are not improved with vaginal estrogen then consider starting Replens.    Follow-up:  - She is moving back to [place]  an will therefore likely NOT follow-up with me in the future. She plans to find an OB/GYN in [place]    Cody Childs MD MPH  03/15/2024 2:28 PM

## 2024-03-15 NOTE — NURSING NOTE
"Chief Complaint   Patient presents with    Post-op Visit     2024  Hysteroscopy D & C  Polyp        Initial /84 (BP Location: Right arm, Cuff Size: Adult Regular)   Wt 59.3 kg (130 lb 12.8 oz)   LMP 2020   BMI 23.92 kg/m   Estimated body mass index is 23.92 kg/m  as calculated from the following:    Height as of 24: 1.575 m (5' 2.01\").    Weight as of this encounter: 59.3 kg (130 lb 12.8 oz).  BP completed using cuff size: regular    Questioned patient about current smoking habits.  Pt. has never smoked.          The following HM Due: NONE      Pt is felling well        Prudence Benitez, CMA on 3/15/2024 at 2:26 PM       "

## 2024-03-15 NOTE — PATIENT INSTRUCTIONS
1) Lichen Sclerosis     Lichen sclerosis is a chronic condition that occurs most often in postmenopausal women, usually involving itching, burning, and pain at the vulva. It likely occurs in 1 in 50 post-menopausal women. It is a benign, chronic, progressive skin condition characterized by inflammation, skin thinning, and distinctive change in the skin architecture of the vulva. The course usually includes frequent recurrences and remissions of signs and symptoms.  Although the risk of vulvar squamous cell carcinoma in women with vulvar Lichen Sclerosis is increased, this risk is estimated to be less than 5 percent. The vulva should be examined at least yearly for the remainder of your life and nonresolving lesions of localized thickened skin should be biopsied.  The goals of treatment is resolution of the symptoms (itching and pain) and signs of disease, including thickened skin, fissuring, and bruising.   Topical steroid ointment treatment is a highly effective therapy that can prevent progression of the disease. It is important to avoid scratching the area to prevent further progression.  Most patients will need to continue the clobetasol twice weekly on an ongoing basis to prevent worsening of symptoms. Ongoing use also decreases your risk of the Lichen Sclerosus evolving into skin cancer, which is very rare.    You should examine the vulvar area with a mirror and fingertips on a monthly basis and should return for evaluation if you see thickened areas of skin or sores that do not heal  If you ever develop worsening symptoms increase the use of the ointment again to day for 7-10 days, then decrease to twice a week again. If you have worsening symptoms that do not respond to a burst of treatment as described, please return for evaluation  Use Clobetasol (hyperpotent topical steroid) to treat Lichen Sclerosis  - Use 1/2 fingertip unit (from the tip of your index finger to the first crease) clobetasol ointment in a  thin layer applied to the vulva nightly for 4 weeks, every other night for 4 weeks, and then twice weekly.     - Do not apply ointment to any areas that have hair. Do no put any ointment into your vagina     - Most patients will need to continue the clobetasol twice weekly on an ongoing basis to prevent worsening of symptoms. Ongoing use also decreases your risk of the Lichen Sclerosus evolving into skin cancer, which is very rare.      - If you ever develop worsening symptoms increase the use of the ointment again to day for 7-10 days, then decrease to twice a week again. If you have worsening symptoms that do not respond to a burst of treatment as described, please return for evaluation.    - Over time lichen sclerosis can cause the vagina to narrow. You may need to treatment with vaginal dilators in the future to keep the vagina from getting too narrow    - Find an OB/GYN in [place] who will continue to continue to treat Lichen Sclerosis    Continue to apply Vaseline to any areas of irritation or discomfort on your vulva. I recommend soaking your vulva in hot water (or apply a hot, wet towel) for 15 to 20 minutes. Afterwards apply Vaseline. You can apply Vaseline several times a day.      2) Vaginal Dryness    Vaginal atrophy is a normal part of the aging process and is a reaction to decreased circulating estrogen, but this doesn't mean that there is nothing we can do about it. Symptoms often include vaginal dryness, itching, pain with intercourse, and narrowing of the vagina. I recommend the following:     - Start to using a vaginal estrogen cream (Estrace). Rub a pea sized amount of cream to the opening of your vagina with your finger daily for 14 days, and then twice a week after that.    - Make sure that you use plenty of lubrication when having intercourse to reduce pain and make sex more enjoyable.    - In clinic we talked about trying a vaginal moisturizer. I would actually recommend NOT starting a vaginal  moisturizer yet, and starting with estrogen cream (estrace). If you are still having dryness after month or two you could then consider starting the vaginal moisturizer. A common brand that my patients use is called Replens.

## 2024-03-19 ENCOUNTER — MYC MEDICAL ADVICE (OUTPATIENT)
Dept: NEUROLOGY | Facility: CLINIC | Age: 54
End: 2024-03-19
Payer: OTHER GOVERNMENT

## 2024-03-25 DIAGNOSIS — G43.709 CHRONIC MIGRAINE W/O AURA W/O STATUS MIGRAINOSUS, NOT INTRACTABLE: Primary | ICD-10-CM

## 2024-03-29 ENCOUNTER — OFFICE VISIT (OUTPATIENT)
Dept: FAMILY MEDICINE | Facility: CLINIC | Age: 54
End: 2024-03-29
Payer: OTHER GOVERNMENT

## 2024-03-29 VITALS
SYSTOLIC BLOOD PRESSURE: 155 MMHG | OXYGEN SATURATION: 100 % | RESPIRATION RATE: 16 BRPM | BODY MASS INDEX: 23.92 KG/M2 | WEIGHT: 135 LBS | TEMPERATURE: 97.8 F | HEART RATE: 87 BPM | HEIGHT: 63 IN | DIASTOLIC BLOOD PRESSURE: 95 MMHG

## 2024-03-29 DIAGNOSIS — Z71.84 ENCOUNTER FOR COUNSELING FOR TRAVEL: Primary | ICD-10-CM

## 2024-03-29 DIAGNOSIS — Z23 NEED FOR IMMUNIZATION AGAINST YELLOW FEVER: ICD-10-CM

## 2024-03-29 PROCEDURE — 99402 PREV MED CNSL INDIV APPRX 30: CPT | Mod: 25 | Performed by: PHYSICIAN ASSISTANT

## 2024-03-29 PROCEDURE — 90717 YELLOW FEVER VACCINE SUBQ: CPT | Mod: GA | Performed by: PHYSICIAN ASSISTANT

## 2024-03-29 PROCEDURE — 90471 IMMUNIZATION ADMIN: CPT | Mod: GA | Performed by: PHYSICIAN ASSISTANT

## 2024-03-29 RX ORDER — AZITHROMYCIN 500 MG/1
TABLET, FILM COATED ORAL
Qty: 3 TABLET | Refills: 0 | Status: SHIPPED | OUTPATIENT
Start: 2024-03-29

## 2024-03-29 NOTE — PATIENT INSTRUCTIONS
"See travel packet provided  Recommend ultrathon (mosquito repellant), pepto bismol and imodium  The food and drink choices you make while traveling can impact your likelihood of getting sick.   If you aren't sure if a food or drink is safe, the saying \" BOIL IT, COOK IT, PEEL IT, OR FORGET IT\" can help you decide whether it's okay to consume.   Also bring hand  and sun screen with you.  Safe Travels     If you first start to get mild to moderate diarrhea, take imodium.      If diarrhea is severe or you have a fever with the diarrhea, take the antibiotic (azithromycin).       Today March 29, 2024 you received the    Yellow Fever (YF).    These appointments can be made as a NURSE ONLY visit.    **It is very important for the vaccinations to be given on the scheduled day(s), this helps ensure you receive the full effectiveness of the vaccine.**    Please call [facility] with any questions [phone]    Thank you for visiting [facility]    "

## 2024-03-29 NOTE — PROGRESS NOTES
SUBJECTIVE: Dalia Fried , a 53 year old  female, presents for counseling and information regarding upcoming travel to [place]. Special medical concerns include: none. She anticipates the following unusual exposures: none.    Itinerary:  [place]- [place]    Departure Date: 4/12/2024 Return date: 5/17/2024    Reason for travel (i.e. Business, pleasure): pleasure    Visiting an urban or rural area?: urban    Accommodations (i.e. hotel, hostel, friends, family, etc): hotel    Women - First day of your last period: NA    IMMUNIZATION HISTORY  Have you received any vaccinations in the past 4 weeks?  No  Have you ever fainted from having your blood drawn or from an injection?  No  Have you ever had a fever reaction to vaccination?  No  Have you ever had any bad reaction or side effect from any vaccination?  No  Have you ever had hepatitis A or B vaccine?   unknown  Do you live (or work closely) with anyone who has AIDS, an AIDS-like condition, any other immune disorder or who is on chemotherapy for cancer?  No  Have you received any injection of immune globulin or any blood products during the past 12 months?  No    GENERAL MEDICAL HISTORY  Do you have a medical condition that warrants maintenance medication or physician follow-up?  No  Do you have a medical condition that is stable now, but that may recur while traveling?  No  Has your spleen been removed?  No  Have you had an acute illness or a fever in the past 48 hours?  No  Are you pregnant, or might you become pregnant on this trip?  Any chance of pregnancy?  No  Are you breastfeeding?  No  Do you have HIV, AIDS, an AIDS-like condition, any other immune disorder, leukemia or cancer?  No  Do you have a severe combined immunodeficiency disease?  No  Have you had your thymus gland removed or history of problems with your thymus, such as myasthenia gravis, DiGeorge syndrome, or thymoma?  No    Do you have severe thrombocytopenia (low platelet count) or a  coagulation disorder?  No  Have you ever had a convulsion, seizure, epilepsy, neurologic condition or brain infection?  No  Do you have any stomach conditions?  No  Do you have a G6PD deficiency?  No  Do you have severe renal or kidney impairment?  No  Do you have a history of psychiatric problems?  No  Do you have a problem with strange dreams and/or nightmares?  No  Do you have insomnia?  No  Do you have problems with vaginitis?  No  Do you have psoriasis?  No  Are you prone to motion sickness?  No  Have you ever had headaches, nausea, vomiting, or breathing problems from altitude exposure?  No      Past Medical History:   Diagnosis Date    Head and neck malignancy (H) 06/21/2023    Hearing loss       Immunization History   Administered Date(s) Administered    COVID-19 MONOVALENT 12+ (Pfizer) 09/04/2021, 10/02/2021    Influenza Vaccine 18-64 (Flublok) 10/14/2023    TDAP (Adacel,Boostrix) 08/07/2023    Zoster recombinant adjuvanted (SHINGRIX) 08/07/2023       Current Outpatient Medications   Medication Sig Dispense Refill    clobetasol (TEMOVATE) 0.05 % external ointment Use 1/2 fingertip unit (from the tip of your index finger to the first crease) in a thin layer applied to the vulva nightly for 4 weeks, every other night for 4 weeks, and then twice weekly 60 g 1    estradiol (ESTRACE) 0.1 MG/GM vaginal cream Rub a pea sized amount of cream to the opening of your vagina with your finger daily for 14 days, and then twice a week ongoing. 42.5 g 1    phentermine (ADIPEX-P) 37.5 MG capsule Take 1 capsule (37.5 mg) by mouth every morning 30 capsule 5    aspirin-acetaminophen-caffeine (EXCEDRIN MIGRAINE) 250-250-65 MG tablet Take 1 tablet by mouth daily as needed for headaches (Patient not taking: Reported on 3/15/2024)      estradiol (ESTRACE) 0.1 MG/GM vaginal cream Apply 4 g every night x 2 weeks then change to 4 g at bedtime 3 times a week (Patient not taking: Reported on 2/5/2024) 42.5 g 4    ibuprofen  (ADVIL/MOTRIN) 200 MG tablet Take 200 mg by mouth every 4 hours as needed for pain (Patient not taking: Reported on 3/15/2024)      ondansetron (ZOFRAN ODT) 4 MG ODT tab Take 1 tablet (4 mg) by mouth every 8 hours as needed for nausea (Patient not taking: Reported on 3/15/2024) 30 tablet 3     Allergies   Allergen Reactions    No Known Allergies         EXAM: deferred    Immunizations discussed include: Hepatitis A, Typhoid, and Yellow Fever (declined hepatitis A and typhoid)  Malaria prophylaxis recommended: not needed  Symptomatic treatment for traveler's diarrhea: bismuth subsalicylate, loperamide/diphenoxylate, and azithromycin    ASSESSMENT/PLAN:    (Z71.84) Encounter for counseling for travel  (primary encounter diagnosis)    Comment: Yellow fever vaccines today. Patient will return or follow-up with PCP as needed. Prophylaxis given for Traveler's diarrhea and is not needed for Malaria. All questions were answered.     Plan: azithromycin (ZITHROMAX) 500 MG tablet            (Z23) Need for immunization against yellow fever  Comment:   Plan: YELLOW FEVER, LIVE SQ              I have reviewed general recommendations for safe travel   including: food/water precautions, insect avoidance, safe sex   practices given high prevalence of HIV and other STDs,   roadway safety. Educational materials and links to the CDC   Traveler's health website have been provided.    Total time 23 minutes, greater than 50 percent in counseling   and coordination of care.

## 2024-03-29 NOTE — NURSING NOTE
Prior to immunization administration, verified patients identity using patient s name and date of birth. Please see Immunization Activity for additional information.     Screening Questionnaire for Adult Immunization    Are you sick today?   No   Do you have allergies to medications, food, a vaccine component or latex?   No   Have you ever had a serious reaction after receiving a vaccination?   No   Do you have a long-term health problem with heart, lung, kidney, or metabolic disease (e.g., diabetes), asthma, a blood disorder, no spleen, complement component deficiency, a cochlear implant, or a spinal fluid leak?  Are you on long-term aspirin therapy?   No   Do you have cancer, leukemia, HIV/AIDS, or any other immune system problem?   No   Do you have a parent, brother, or sister with an immune system problem?   No   In the past 3 months, have you taken medications that affect  your immune system, such as prednisone, other steroids, or anticancer drugs; drugs for the treatment of rheumatoid arthritis, Crohn s disease, or psoriasis; or have you had radiation treatments?   No   Have you had a seizure, or a brain or other nervous system problem?   No   During the past year, have you received a transfusion of blood or blood    products, or been given immune (gamma) globulin or antiviral drug?   No   For women: Are you pregnant or is there a chance you could become       pregnant during the next month?   No   Have you received any vaccinations in the past 4 weeks?   No     Immunization questionnaire answers were all negative.      Patient instructed to remain in clinic for 15 minutes afterwards, and to report any adverse reactions.     Screening performed by Amanda Hernandez CMA on 3/29/2024 at 11:44 AM.

## 2024-07-18 ENCOUNTER — PATIENT OUTREACH (OUTPATIENT)
Dept: FAMILY MEDICINE | Facility: CLINIC | Age: 54
End: 2024-07-18
Payer: OTHER GOVERNMENT

## 2024-07-18 DIAGNOSIS — R87.810 CERVICAL HIGH RISK HPV (HUMAN PAPILLOMAVIRUS) TEST POSITIVE: Primary | ICD-10-CM

## 2024-09-11 NOTE — TELEPHONE ENCOUNTER
FYI to provider - Patient is lost to pap tracking follow-up. Attempts to contact pt have been made per reminder process and there has been no reply and/or no appt scheduled. Contact hx listed below.     8/7/23 NIL pap, +HR HPV, not 16/18. Plan 1 yr co-test /notified  7/18/24 Reminder MyChart  8/12/24 Reminder call - left message  9/11/24 Lost to follow-up for pap tracking     Joanne Cabral RN BSN, Pap Tracking

## 2024-10-06 ENCOUNTER — HEALTH MAINTENANCE LETTER (OUTPATIENT)
Age: 54
End: 2024-10-06

## (undated) DEVICE — LINEN HALF SHEET 5512

## (undated) DEVICE — KIT PROCEDURE FLUENT IN/OUT FLOWPAK TISS TRAP FLT-112S

## (undated) DEVICE — NDL SPINAL 25GA 3.5" QUINCKE 405180

## (undated) DEVICE — BASIN SET MINOR DISP

## (undated) DEVICE — GLOVE BIOGEL PI ULTRATOUCH G SZ 7.5 42175

## (undated) DEVICE — PACK MINOR LITHOTOMY RIDGES

## (undated) DEVICE — SYR 10ML FINGER CONTROL W/O NDL 309695

## (undated) DEVICE — SOL NACL 0.9% IRRIG 3000ML BAG 2B7477

## (undated) DEVICE — SEAL SET MYOSURE ROD LENS SCOPE SINGLE USE 40-902

## (undated) DEVICE — DEVICE TISSUE REMOVAL HYSTEROSCOPIC MYOSURE LITE 30-401LITE

## (undated) DEVICE — LINEN FULL SHEET 5511

## (undated) DEVICE — DRAPE UNDER BUTTOCK 89415

## (undated) DEVICE — GLOVE BIOGEL PI MICRO INDICATOR UNDERGLOVE SZ 8.0 48980

## (undated) DEVICE — BAG CLEAR TRASH 1.3M 39X33" P4040C

## (undated) DEVICE — SOL NACL 0.9% IRRIG 1000ML BOTTLE 2F7124

## (undated) DEVICE — PAD CHUX UNDERPAD 30X36" P3036C

## (undated) DEVICE — KIT ENDO TURNOVER/PROCEDURE W/CLEAN A SCOPE LINERS 103888

## (undated) RX ORDER — LIDOCAINE HYDROCHLORIDE 10 MG/ML
INJECTION, SOLUTION EPIDURAL; INFILTRATION; INTRACAUDAL; PERINEURAL
Status: DISPENSED
Start: 2024-02-20

## (undated) RX ORDER — PROPOFOL 10 MG/ML
INJECTION, EMULSION INTRAVENOUS
Status: DISPENSED
Start: 2024-02-20

## (undated) RX ORDER — DEXAMETHASONE SODIUM PHOSPHATE 4 MG/ML
INJECTION, SOLUTION INTRA-ARTICULAR; INTRALESIONAL; INTRAMUSCULAR; INTRAVENOUS; SOFT TISSUE
Status: DISPENSED
Start: 2024-02-20

## (undated) RX ORDER — METHADONE HYDROCHLORIDE 10 MG/ML
INJECTION, SOLUTION INTRAMUSCULAR; INTRAVENOUS; SUBCUTANEOUS
Status: DISPENSED
Start: 2024-02-20

## (undated) RX ORDER — FENTANYL CITRATE 50 UG/ML
INJECTION, SOLUTION INTRAMUSCULAR; INTRAVENOUS
Status: DISPENSED
Start: 2023-11-17

## (undated) RX ORDER — ACETAMINOPHEN 325 MG/1
TABLET ORAL
Status: DISPENSED
Start: 2024-02-20

## (undated) RX ORDER — ONDANSETRON 2 MG/ML
INJECTION INTRAMUSCULAR; INTRAVENOUS
Status: DISPENSED
Start: 2024-02-20

## (undated) RX ORDER — CITRIC ACID/SODIUM CITRATE 334-500MG
SOLUTION, ORAL ORAL
Status: DISPENSED
Start: 2024-02-20